# Patient Record
Sex: FEMALE | Race: WHITE | Employment: FULL TIME | URBAN - METROPOLITAN AREA
[De-identification: names, ages, dates, MRNs, and addresses within clinical notes are randomized per-mention and may not be internally consistent; named-entity substitution may affect disease eponyms.]

---
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
## 2022-01-14 ENCOUNTER — ANESTHESIA (OUTPATIENT)
Dept: SURGERY | Age: 22
End: 2022-01-14
Payer: COMMERCIAL

## 2022-01-14 ENCOUNTER — ANESTHESIA EVENT (OUTPATIENT)
Dept: SURGERY | Age: 22
End: 2022-01-14
Payer: COMMERCIAL

## 2022-01-14 ENCOUNTER — HOSPITAL ENCOUNTER (OUTPATIENT)
Age: 22
Setting detail: OUTPATIENT SURGERY
Discharge: HOME | End: 2022-01-14
Attending: STUDENT IN AN ORGANIZED HEALTH CARE EDUCATION/TRAINING PROGRAM | Admitting: STUDENT IN AN ORGANIZED HEALTH CARE EDUCATION/TRAINING PROGRAM
Payer: COMMERCIAL

## 2022-01-14 VITALS
TEMPERATURE: 98.5 F | HEIGHT: 65 IN | OXYGEN SATURATION: 100 % | RESPIRATION RATE: 17 BRPM | SYSTOLIC BLOOD PRESSURE: 106 MMHG | HEART RATE: 67 BPM | WEIGHT: 113 LBS | DIASTOLIC BLOOD PRESSURE: 71 MMHG | BODY MASS INDEX: 18.83 KG/M2

## 2022-01-14 DIAGNOSIS — Z98.890 STATUS POST LAPAROSCOPY: Primary | ICD-10-CM

## 2022-01-14 PROBLEM — O00.90 ECTOPIC PREGNANCY WITHOUT INTRAUTERINE PREGNANCY: Status: ACTIVE | Noted: 2022-01-14

## 2022-01-14 PROBLEM — Z97.5 NEXPLANON IN PLACE: Status: RESOLVED | Noted: 2020-07-16 | Resolved: 2022-01-14

## 2022-01-14 LAB
ABO + RH BLD: NORMAL
BASOPHILS # BLD: 0 K/UL (ref 0–0.1)
BASOPHILS NFR BLD: 0 % (ref 0–2)
BLOOD GROUP ANTIBODIES SERPL: NORMAL
COVID-19 RAPID TEST, COVR: NOT DETECTED
DIFFERENTIAL METHOD BLD: NORMAL
EOSINOPHIL # BLD: 0.1 K/UL (ref 0–0.4)
EOSINOPHIL NFR BLD: 1 % (ref 0–5)
ERYTHROCYTE [DISTWIDTH] IN BLOOD BY AUTOMATED COUNT: 11.9 % (ref 11.6–14.5)
HCT VFR BLD AUTO: 42.1 % (ref 35–45)
HGB BLD-MCNC: 13.9 G/DL (ref 12–16)
IMM GRANULOCYTES # BLD AUTO: 0 K/UL (ref 0–0.04)
IMM GRANULOCYTES NFR BLD AUTO: 0 % (ref 0–0.5)
LYMPHOCYTES # BLD: 1.5 K/UL (ref 0.9–3.6)
LYMPHOCYTES NFR BLD: 25 % (ref 21–52)
MCH RBC QN AUTO: 28.8 PG (ref 24–34)
MCHC RBC AUTO-ENTMCNC: 33 G/DL (ref 31–37)
MCV RBC AUTO: 87.2 FL (ref 78–100)
MONOCYTES # BLD: 0.4 K/UL (ref 0.05–1.2)
MONOCYTES NFR BLD: 6 % (ref 3–10)
NEUTS SEG # BLD: 4.2 K/UL (ref 1.8–8)
NEUTS SEG NFR BLD: 68 % (ref 40–73)
NRBC # BLD: 0 K/UL (ref 0–0.01)
NRBC BLD-RTO: 0 PER 100 WBC
PLATELET # BLD AUTO: 207 K/UL (ref 135–420)
PMV BLD AUTO: 9.7 FL (ref 9.2–11.8)
RBC # BLD AUTO: 4.83 M/UL (ref 4.2–5.3)
SOURCE, COVRS: NORMAL
SPECIMEN EXP DATE BLD: NORMAL
WBC # BLD AUTO: 6.2 K/UL (ref 4.6–13.2)

## 2022-01-14 PROCEDURE — 2709999900 HC NON-CHARGEABLE SUPPLY: Performed by: STUDENT IN AN ORGANIZED HEALTH CARE EDUCATION/TRAINING PROGRAM

## 2022-01-14 PROCEDURE — 76210000063 HC OR PH I REC FIRST 0.5 HR: Performed by: STUDENT IN AN ORGANIZED HEALTH CARE EDUCATION/TRAINING PROGRAM

## 2022-01-14 PROCEDURE — 77030005513 HC CATH URETH FOL11 MDII -B: Performed by: STUDENT IN AN ORGANIZED HEALTH CARE EDUCATION/TRAINING PROGRAM

## 2022-01-14 PROCEDURE — 74011250636 HC RX REV CODE- 250/636

## 2022-01-14 PROCEDURE — 77030013079 HC BLNKT BAIR HGGR 3M -A: Performed by: ANESTHESIOLOGY

## 2022-01-14 PROCEDURE — 74011250636 HC RX REV CODE- 250/636: Performed by: STUDENT IN AN ORGANIZED HEALTH CARE EDUCATION/TRAINING PROGRAM

## 2022-01-14 PROCEDURE — 87635 SARS-COV-2 COVID-19 AMP PRB: CPT

## 2022-01-14 PROCEDURE — 86900 BLOOD TYPING SEROLOGIC ABO: CPT

## 2022-01-14 PROCEDURE — 77030009851 HC PCH RTVR ENDOSC AMR -B: Performed by: STUDENT IN AN ORGANIZED HEALTH CARE EDUCATION/TRAINING PROGRAM

## 2022-01-14 PROCEDURE — 77030033200 HC PRT CLSR CRTR THOMP COOP -C: Performed by: STUDENT IN AN ORGANIZED HEALTH CARE EDUCATION/TRAINING PROGRAM

## 2022-01-14 PROCEDURE — 74011000250 HC RX REV CODE- 250: Performed by: STUDENT IN AN ORGANIZED HEALTH CARE EDUCATION/TRAINING PROGRAM

## 2022-01-14 PROCEDURE — 77030020782 HC GWN BAIR PAWS FLX 3M -B: Performed by: STUDENT IN AN ORGANIZED HEALTH CARE EDUCATION/TRAINING PROGRAM

## 2022-01-14 PROCEDURE — 74011000250 HC RX REV CODE- 250

## 2022-01-14 PROCEDURE — 77030008574 HC TBNG SUC IRR STRY -B: Performed by: STUDENT IN AN ORGANIZED HEALTH CARE EDUCATION/TRAINING PROGRAM

## 2022-01-14 PROCEDURE — 77030002933 HC SUT MCRYL J&J -A: Performed by: STUDENT IN AN ORGANIZED HEALTH CARE EDUCATION/TRAINING PROGRAM

## 2022-01-14 PROCEDURE — 77030016151 HC PROTCTR LNS DFOG COVD -B: Performed by: STUDENT IN AN ORGANIZED HEALTH CARE EDUCATION/TRAINING PROGRAM

## 2022-01-14 PROCEDURE — 77030012770 HC TRCR OPT FX AMR -B: Performed by: STUDENT IN AN ORGANIZED HEALTH CARE EDUCATION/TRAINING PROGRAM

## 2022-01-14 PROCEDURE — 77030040361 HC SLV COMPR DVT MDII -B: Performed by: STUDENT IN AN ORGANIZED HEALTH CARE EDUCATION/TRAINING PROGRAM

## 2022-01-14 PROCEDURE — 77030031139 HC SUT VCRL2 J&J -A: Performed by: STUDENT IN AN ORGANIZED HEALTH CARE EDUCATION/TRAINING PROGRAM

## 2022-01-14 PROCEDURE — 76060000033 HC ANESTHESIA 1 TO 1.5 HR: Performed by: STUDENT IN AN ORGANIZED HEALTH CARE EDUCATION/TRAINING PROGRAM

## 2022-01-14 PROCEDURE — 76010000149 HC OR TIME 1 TO 1.5 HR: Performed by: STUDENT IN AN ORGANIZED HEALTH CARE EDUCATION/TRAINING PROGRAM

## 2022-01-14 PROCEDURE — 77030012799 HC TRCR GELPRT BLN AMR -B: Performed by: STUDENT IN AN ORGANIZED HEALTH CARE EDUCATION/TRAINING PROGRAM

## 2022-01-14 PROCEDURE — 77030006643: Performed by: ANESTHESIOLOGY

## 2022-01-14 PROCEDURE — 77030008683 HC TU ET CUF COVD -A: Performed by: ANESTHESIOLOGY

## 2022-01-14 PROCEDURE — 77030010507 HC ADH SKN DERMBND J&J -B: Performed by: STUDENT IN AN ORGANIZED HEALTH CARE EDUCATION/TRAINING PROGRAM

## 2022-01-14 PROCEDURE — 88305 TISSUE EXAM BY PATHOLOGIST: CPT

## 2022-01-14 PROCEDURE — 77030020829: Performed by: STUDENT IN AN ORGANIZED HEALTH CARE EDUCATION/TRAINING PROGRAM

## 2022-01-14 PROCEDURE — 85025 COMPLETE CBC W/AUTO DIFF WBC: CPT

## 2022-01-14 PROCEDURE — 74011000272 HC RX REV CODE- 272: Performed by: STUDENT IN AN ORGANIZED HEALTH CARE EDUCATION/TRAINING PROGRAM

## 2022-01-14 PROCEDURE — 76210000021 HC REC RM PH II 0.5 TO 1 HR: Performed by: STUDENT IN AN ORGANIZED HEALTH CARE EDUCATION/TRAINING PROGRAM

## 2022-01-14 PROCEDURE — 36415 COLL VENOUS BLD VENIPUNCTURE: CPT

## 2022-01-14 RX ORDER — HYDROCODONE BITARTRATE AND ACETAMINOPHEN 5; 325 MG/1; MG/1
1 TABLET ORAL AS NEEDED
Status: DISCONTINUED | OUTPATIENT
Start: 2022-01-14 | End: 2022-01-14 | Stop reason: HOSPADM

## 2022-01-14 RX ORDER — KETAMINE HCL IN 0.9 % NACL 50 MG/5 ML
SYRINGE (ML) INTRAVENOUS AS NEEDED
Status: DISCONTINUED | OUTPATIENT
Start: 2022-01-14 | End: 2022-01-14 | Stop reason: HOSPADM

## 2022-01-14 RX ORDER — SODIUM CHLORIDE, SODIUM LACTATE, POTASSIUM CHLORIDE, CALCIUM CHLORIDE 600; 310; 30; 20 MG/100ML; MG/100ML; MG/100ML; MG/100ML
125 INJECTION, SOLUTION INTRAVENOUS CONTINUOUS
Status: DISCONTINUED | OUTPATIENT
Start: 2022-01-14 | End: 2022-01-14 | Stop reason: HOSPADM

## 2022-01-14 RX ORDER — FLUMAZENIL 0.1 MG/ML
0.2 INJECTION INTRAVENOUS
Status: DISCONTINUED | OUTPATIENT
Start: 2022-01-14 | End: 2022-01-14 | Stop reason: HOSPADM

## 2022-01-14 RX ORDER — ROCURONIUM BROMIDE 10 MG/ML
INJECTION, SOLUTION INTRAVENOUS AS NEEDED
Status: DISCONTINUED | OUTPATIENT
Start: 2022-01-14 | End: 2022-01-14 | Stop reason: HOSPADM

## 2022-01-14 RX ORDER — SODIUM CHLORIDE 0.9 % (FLUSH) 0.9 %
5-40 SYRINGE (ML) INJECTION AS NEEDED
Status: DISCONTINUED | OUTPATIENT
Start: 2022-01-14 | End: 2022-01-14 | Stop reason: HOSPADM

## 2022-01-14 RX ORDER — FENTANYL CITRATE 50 UG/ML
INJECTION, SOLUTION INTRAMUSCULAR; INTRAVENOUS AS NEEDED
Status: DISCONTINUED | OUTPATIENT
Start: 2022-01-14 | End: 2022-01-14 | Stop reason: HOSPADM

## 2022-01-14 RX ORDER — SODIUM CHLORIDE 0.9 % (FLUSH) 0.9 %
5-40 SYRINGE (ML) INJECTION EVERY 8 HOURS
Status: DISCONTINUED | OUTPATIENT
Start: 2022-01-14 | End: 2022-01-14 | Stop reason: HOSPADM

## 2022-01-14 RX ORDER — GLYCOPYRROLATE 0.2 MG/ML
INJECTION INTRAMUSCULAR; INTRAVENOUS AS NEEDED
Status: DISCONTINUED | OUTPATIENT
Start: 2022-01-14 | End: 2022-01-14 | Stop reason: HOSPADM

## 2022-01-14 RX ORDER — ONDANSETRON 2 MG/ML
4 INJECTION INTRAMUSCULAR; INTRAVENOUS ONCE
Status: COMPLETED | OUTPATIENT
Start: 2022-01-14 | End: 2022-01-14

## 2022-01-14 RX ORDER — MIDAZOLAM HYDROCHLORIDE 1 MG/ML
INJECTION, SOLUTION INTRAMUSCULAR; INTRAVENOUS AS NEEDED
Status: DISCONTINUED | OUTPATIENT
Start: 2022-01-14 | End: 2022-01-14 | Stop reason: HOSPADM

## 2022-01-14 RX ORDER — DEXAMETHASONE SODIUM PHOSPHATE 4 MG/ML
INJECTION, SOLUTION INTRA-ARTICULAR; INTRALESIONAL; INTRAMUSCULAR; INTRAVENOUS; SOFT TISSUE AS NEEDED
Status: DISCONTINUED | OUTPATIENT
Start: 2022-01-14 | End: 2022-01-14 | Stop reason: HOSPADM

## 2022-01-14 RX ORDER — IBUPROFEN 800 MG/1
800 TABLET ORAL
Qty: 60 TABLET | Refills: 1 | OUTPATIENT
Start: 2022-01-14 | End: 2022-05-26

## 2022-01-14 RX ORDER — ONDANSETRON 2 MG/ML
INJECTION INTRAMUSCULAR; INTRAVENOUS
Status: COMPLETED
Start: 2022-01-14 | End: 2022-01-14

## 2022-01-14 RX ORDER — PROPOFOL 10 MG/ML
INJECTION, EMULSION INTRAVENOUS AS NEEDED
Status: DISCONTINUED | OUTPATIENT
Start: 2022-01-14 | End: 2022-01-14 | Stop reason: HOSPADM

## 2022-01-14 RX ORDER — OXYCODONE HYDROCHLORIDE 5 MG/1
5 TABLET ORAL
Qty: 8 TABLET | Refills: 0 | Status: SHIPPED | OUTPATIENT
Start: 2022-01-14 | End: 2022-01-17

## 2022-01-14 RX ORDER — NEOSTIGMINE METHYLSULFATE 1 MG/ML
INJECTION, SOLUTION INTRAVENOUS AS NEEDED
Status: DISCONTINUED | OUTPATIENT
Start: 2022-01-14 | End: 2022-01-14 | Stop reason: HOSPADM

## 2022-01-14 RX ORDER — NALOXONE HYDROCHLORIDE 0.4 MG/ML
0.4 INJECTION, SOLUTION INTRAMUSCULAR; INTRAVENOUS; SUBCUTANEOUS AS NEEDED
Status: DISCONTINUED | OUTPATIENT
Start: 2022-01-14 | End: 2022-01-14 | Stop reason: HOSPADM

## 2022-01-14 RX ORDER — LIDOCAINE HYDROCHLORIDE 20 MG/ML
INJECTION, SOLUTION EPIDURAL; INFILTRATION; INTRACAUDAL; PERINEURAL AS NEEDED
Status: DISCONTINUED | OUTPATIENT
Start: 2022-01-14 | End: 2022-01-14 | Stop reason: HOSPADM

## 2022-01-14 RX ORDER — HYDROMORPHONE HYDROCHLORIDE 1 MG/ML
0.5 INJECTION, SOLUTION INTRAMUSCULAR; INTRAVENOUS; SUBCUTANEOUS
Status: DISCONTINUED | OUTPATIENT
Start: 2022-01-14 | End: 2022-01-14 | Stop reason: HOSPADM

## 2022-01-14 RX ORDER — ONDANSETRON 2 MG/ML
INJECTION INTRAMUSCULAR; INTRAVENOUS AS NEEDED
Status: DISCONTINUED | OUTPATIENT
Start: 2022-01-14 | End: 2022-01-14 | Stop reason: HOSPADM

## 2022-01-14 RX ORDER — FENTANYL CITRATE 50 UG/ML
50 INJECTION, SOLUTION INTRAMUSCULAR; INTRAVENOUS AS NEEDED
Status: DISCONTINUED | OUTPATIENT
Start: 2022-01-14 | End: 2022-01-14 | Stop reason: HOSPADM

## 2022-01-14 RX ORDER — SUCCINYLCHOLINE CHLORIDE 100 MG/5ML
SYRINGE (ML) INTRAVENOUS AS NEEDED
Status: DISCONTINUED | OUTPATIENT
Start: 2022-01-14 | End: 2022-01-14 | Stop reason: HOSPADM

## 2022-01-14 RX ADMIN — Medication 80 MG: at 18:10

## 2022-01-14 RX ADMIN — MIDAZOLAM 2 MG: 1 INJECTION INTRAMUSCULAR; INTRAVENOUS at 18:03

## 2022-01-14 RX ADMIN — SODIUM CHLORIDE, SODIUM LACTATE, POTASSIUM CHLORIDE, AND CALCIUM CHLORIDE: 600; 310; 30; 20 INJECTION, SOLUTION INTRAVENOUS at 18:02

## 2022-01-14 RX ADMIN — Medication 2 MG: at 18:55

## 2022-01-14 RX ADMIN — SODIUM CHLORIDE, SODIUM LACTATE, POTASSIUM CHLORIDE, AND CALCIUM CHLORIDE 125 ML/HR: 600; 310; 30; 20 INJECTION, SOLUTION INTRAVENOUS at 17:52

## 2022-01-14 RX ADMIN — ROCURONIUM BROMIDE 20 MG: 10 INJECTION, SOLUTION INTRAVENOUS at 18:22

## 2022-01-14 RX ADMIN — FENTANYL CITRATE 50 MCG: 50 INJECTION, SOLUTION INTRAMUSCULAR; INTRAVENOUS at 18:26

## 2022-01-14 RX ADMIN — DEXAMETHASONE SODIUM PHOSPHATE 8 MG: 4 INJECTION, SOLUTION INTRAMUSCULAR; INTRAVENOUS at 18:23

## 2022-01-14 RX ADMIN — FENTANYL CITRATE 50 MCG: 50 INJECTION, SOLUTION INTRAMUSCULAR; INTRAVENOUS at 18:08

## 2022-01-14 RX ADMIN — ONDANSETRON 4 MG: 2 INJECTION INTRAMUSCULAR; INTRAVENOUS at 19:57

## 2022-01-14 RX ADMIN — ONDANSETRON HYDROCHLORIDE 4 MG: 2 INJECTION INTRAMUSCULAR; INTRAVENOUS at 18:23

## 2022-01-14 RX ADMIN — Medication 10 MG: at 18:39

## 2022-01-14 RX ADMIN — Medication 30 MG: at 18:28

## 2022-01-14 RX ADMIN — GLYCOPYRROLATE 0.2 MG: 0.2 INJECTION INTRAMUSCULAR; INTRAVENOUS at 18:55

## 2022-01-14 RX ADMIN — LIDOCAINE HYDROCHLORIDE 50 MG: 20 INJECTION, SOLUTION INTRAVENOUS at 18:10

## 2022-01-14 RX ADMIN — PROPOFOL 120 MG: 10 INJECTION, EMULSION INTRAVENOUS at 18:10

## 2022-01-14 RX ADMIN — SUGAMMADEX 100 MG: 100 INJECTION, SOLUTION INTRAVENOUS at 19:14

## 2022-01-14 NOTE — H&P
History & Physical    Name: Lilian Woods MRN: [id number]  SSN: [id number]    YOB: 2000  Age: 22 y.o. Sex: female        Subjective:     Ms. Isabel Meyers is a 27yo  admitted for diagnostic laparoscopy, possible left salpingectomy, possible left oophorectomy, removal of ectopic pregnancy. She was diagnosed with left adnexal ectopic pregnancy in the office. [facility] with inappropriate rise. She reports that she had episode of heavy bleeding yesterday (did not notify us) and has some generalized abdominal soreness. She does have some vaginal bleeding at this time. Blood type is A positive    Past Medical History:   Diagnosis Date    Herpes genitalia 2019     History reviewed. No pertinent surgical history. Social History     Occupational History    Not on file   Tobacco Use    Smoking status: Never Smoker    Smokeless tobacco: Current User   Vaping Use    Vaping Use: Never used   Substance and Sexual Activity    Alcohol use: Yes    Drug use: Never    Sexual activity: Yes     Partners: Male     Birth control/protection: Implant     Comment: Nexplanon- inserted 2020 (outside clinic)     History reviewed. No pertinent family history. Allergies   Allergen Reactions    Penicillins Anaphylaxis and Hives    Cephalexin Hives     Prior to Admission medications    Medication Sig Start Date End Date Taking? Authorizing Provider   etonogestreL (Nexplanon) 68 mg impl by SubDERmal route. Patient not taking: Reported on 2022    Provider, Historical        Review of Systems: A comprehensive review of systems was negative except for that written in the HPI. Objective:     Vitals:  Vitals:    22 1707   BP: 113/73   Pulse: 86   Resp: 14   Temp: 98.6 °F (37 °C)   SpO2: 100%   Weight: 51.3 kg (113 lb)   Height: 5' 5\" (1.651 m)        Physical Exam:  Patient without distress.   Heart: Regular rate and rhythm  Lung: clear to auscultation throughout lung fields and no wheezes, no rales, no rhonchi  Abdomen: soft, nontender, minimal tenderness      Assessment/Plan:     Active Problems:    Ectopic pregnancy without intrauterine pregnancy (1/14/2022)         Plan: Admit for above procedure. R/B/A discussed in detail. Do not feel she is a good candidate for medical management. Discussed that even with salpingectomy, current data does not show that there is any reduction in fertility in future. Patient in agreement with plan. Consents signed. Rapid COVID test approved by Dr. Alexandra Cifuentes.      Signed By:  Umer Contreras MD     January 14, 2022

## 2022-01-14 NOTE — ANESTHESIA PREPROCEDURE EVALUATION
Relevant Problems   No relevant active problems       Anesthetic History        Pertinent negatives: No PONV       Review of Systems / Medical History  Patient summary reviewed, nursing notes reviewed and pertinent labs reviewed    Pulmonary          Smoker (vaped, did not vape today)    Pertinent negatives: No asthma and sleep apnea     Neuro/Psych     seizures (single seizure age 12 after PCN)         Cardiovascular                Pertinent negatives: No hypertension, valvular problems/murmurs and dysrhythmias  Exercise tolerance: >4 METS     GI/Hepatic/Renal             Pertinent negatives: No GERD, liver disease and renal disease   Endo/Other          Pertinent negatives: No diabetes and obesity   Other Findings              Physical Exam    Airway  Mallampati: I  TM Distance: 4 - 6 cm  Neck ROM: normal range of motion   Mouth opening: Normal     Cardiovascular    Rhythm: regular  Rate: normal         Dental  No notable dental hx       Pulmonary  Breath sounds clear to auscultation               Abdominal  GI exam deferred       Other Findings            Anesthetic Plan    ASA: 2  Anesthesia type: general          Induction: Intravenous  Anesthetic plan and risks discussed with: Patient      Plan GETA. Pt understands and accepts associated risks and agrees with plan. All questions answered. Consent signed.

## 2022-01-14 NOTE — PERIOP NOTES
Reviewed PTA medication list with patient/caregiver and patient/caregiver denies any additional medications.  Patient admits to having a responsible adult care for them for at least 24 hours after surgery.     Primary Nurse John Marroquin RN and Amanuel Baltazar RN performed a dual skin assessment on this patient No impairment noted  Socrates score is 23

## 2022-01-14 NOTE — OP NOTES
Operative Report    Patient: Rossi Flynn MRN: [id number]  SSN: [id number]    YOB: 2000  Age: 22 y.o. Sex: female       Date of Surgery: 1/14/2022     Preoperative Diagnosis: Left ECTOPIC PREGNACY     Postoperative Diagnosis: ECTOPIC PREGNACY     Surgeon(s) and Role:     Katlin Rebolledo MD - Primary    Assistant:  Surg Asst-1: Lester Ribeiro  Tasks: Holding camera, retracting, closing    PROCEDURE:    1. Diagnostic laparoscopy  2. Left salpingectomy, removal of ectopic pregnancy    FINDINGS:   1. Laparoscopic entry showed no evidence of blunt or sharp trauma. Insufflation gas noted in preperitoneal space. 2. Uterus was normal size  3. Right Adnexa & Ovarian Fossa normal  4. Left Adnexa with 3cm ectopic pregnancy in fallopian tube. Ovarian Fossa normal  5. Anterior Cul de Sac normal  6. Posterior Cul de Sac with ~15cc blood. EBL:   Minimal <85RS    COMPLICATIONS:  NONE  DRAINS: thomas catheter, removed after conclusion of case  IMPLANTS: NONE    DISPOSITION:  To recovery room in stable condition    PROCEDURE:  The patient was taken to the operating room where general anesthesia was administered and found to be adequate. She was placed in the [address] [place] [address], prepped and draped in the usual sterile fashion. A [employer] manipulator was placed in a normal fashion. Abdominal entry was gained by using a Veress needle. Initially noted to have normal opening pressure but it laila quickly. Infraumbilical trocar was then placed under direct vision. The abdomen and pelvis were then examined laparoscopically and the above findings were noted. A 5mm trocar was placed under direct vision in the LLQ and a 12mm trocar was placed in the RLQ under direct vision. The ectopic pregnancy was noted in the L fallopian tube. The fallopian tube was elevated out of the pelvis. A Harmonic scalpel was used to excise the fallopian tube.  The fallopian tube was placed in an endocatch bag and removed through the 12mm port. The abdomen was then inspected and the pressures were decreased to 5 mmHg and good hemostasis was noted. The trocars were removed under direct vision. The 12mm port was closed with 0-Vicryl. The skin was closed with 4-0 monocryl. Specimens:   ID Type Source Tests Collected by Time Destination   1 : LEFT ECTOPIC PREGNANCY, LEFT FALLOPIAN TUBE Preservative Fallopian [place]  Jeana Salas MD 1/14/2022 1843 Pathology         Counts: Sponge and needle counts were correct times two.     Signed By:  Olive Leung MD     January 14, 2022      Event Time In   Patient In - Facility (Arrived)    Patient In - Pre-op    Patient Ready - For Holding    Patient Out - Pre-op    Patient In - Holding Area 4457   Anesthesia Start 3633   Patient Ready for OR    Surgeon Available    Patient [facility]   Patient In - Port [facility]   Surgeon In [address] [place] [address] Street 1800   Cecum Time    Incision Start 1823   Incision Close 1902   Surgeon out of [address]   Patient Out - OR    PACU Hold in OR     Anesthesia Stop    Patient In - Phase I    Notice to Anesthesia    Care Complete - Phase I    Patient Out - Phase I    Patient In - Phase II    Care Complete - Phase II    Patient Tolerates Liquids    Patient Ready for Visitors    Patient Education Complete    Patient Voided    Patient Out - Phase II    End of Periop Care    Patient In - Overflow    Patient Out - Overflow

## 2022-01-15 NOTE — ANESTHESIA POSTPROCEDURE EVALUATION
Post-Anesthesia Evaluation and Assessment    Patient: Esau Vargas MRN: [id number]  SSN: [id number]   YOB: 2000  Age: [address] y.o. Sex: female      Cardiovascular Function/Vital Signs  /68   Pulse 82   Temp 36.1 °C (97 °F)   Resp 14   Ht 5' 5\" (1.651 m)   Wt 51.3 kg (113 lb)   SpO2 100%   BMI 18.80 kg/m²     Patient is status post Procedure(s):  LAPAROSCOPY, REMOVAL LEFT E CTOPIC PREGNANCY, LEFT SALPINGECTOMY. Nausea/Vomiting: Controlled. Postoperative hydration reviewed and adequate. Pain:  Pain Scale 1: Numeric (0 - 10) (01/14/22 1940)  Pain Intensity 1: 0 (01/14/22 1940)   Managed. Neurological Status:   Neuro (WDL): Within Defined Limits (01/14/22 1714)   At baseline. Mental Status and Level of Consciousness: Arousable. Pulmonary Status:   O2 Device: None (Room air) (01/14/22 1707)   Adequate oxygenation and airway patent. Complications related to anesthesia: None    Post-anesthesia assessment completed. No concerns. Patient has met all discharge requirements. Signed By: Piedad Nunez CRNA    January 14, 2022             Procedure(s):  LAPAROSCOPY, REMOVAL LEFT E CTOPIC PREGNANCY, LEFT SALPINGECTOMY. general    <BSHSIANPOST>    INITIAL Post-op Vital signs:   Vitals Value Taken Time   /74 01/14/22 1950   Temp 36.1 °C (97 °F) 01/14/22 1922   Pulse 78 01/14/22 1951   Resp 9 01/14/22 1951   SpO2 100 % 01/14/22 1951   Vitals shown include unvalidated device data.

## 2022-01-15 NOTE — DISCHARGE INSTRUCTIONS
Treating an Ectopic Pregnancy With Surgery: Care Instructions  Overview     An ectopic pregnancy occurs when a fertilized egg grows outside of the uterus. In a normal pregnancy, the fertilized egg grows inside the uterus. In most ectopic pregnancies, the egg grows in a fallopian tube. This is also called a tubal pregnancy. Sometimes the egg grows in an ovary or some other place in the belly. But this is rare. An ectopic pregnancy never becomes a normal pregnancy and birth. You had surgery to treat your ectopic pregnancy. This was done to prevent dangerous problems. After your surgery, you may have vaginal bleeding that's like a period. It may last for about a week. You may need a few weeks to recover. You should be able to have a normal pregnancy in the future. But you may have a higher risk for more ectopic pregnancies. Tell your doctor right away if you get pregnant again. Follow-up care is a key part of your treatment and safety. Be sure to make and go to all appointments, and call your doctor if you are having problems. It's also a good idea to know your test results and keep a list of the medicines you take. How can you care for yourself at home? · Your doctor might want you to use sanitary pads if you have vaginal bleeding. Using pads makes it easier to keep track of your bleeding. You may use tampons during your next period. It should start in 3 to 6 weeks. · Ask your doctor when you can have vaginal sex again. · Be safe with medicines. Read and follow all instructions on the label. ? If the doctor gave you a prescription medicine for pain, take it as prescribed. ? If you are not taking a prescription pain medicine, ask your doctor if you can take an over-the-counter medicine. · Get plenty of rest. You may be more tired than normal for a few weeks. · Take it easy and avoid lifting until your doctor tells you it is safe to do your normal activities. · Pay attention to your feelings.  If you're sad and it's not getting any easier, talk with your doctor or a counselor. · Talk to your doctor if you want to try to get pregnant soon. The doctor can tell you when it's safe to do so. · If you don't want to get pregnant, ask your doctor about birth control. It's safe to use birth control during your recovery. It's possible to get pregnant again before your next period starts. When should you call for help? Call 911 anytime you think you may need emergency care. For example, call if:    · You passed out (lost consciousness). Call your doctor now or seek immediate medical care if:    · You have severe vaginal bleeding.     · You are dizzy or lightheaded, or you feel like you may faint.     · You have a fever.     · You have new or worse pain in your belly or pelvis.     · You have vaginal discharge that smells bad. Watch closely for changes in your health, and be sure to contact your doctor if:    · You do not get better as expected. Where can you learn more? Go to http://www.gray.com/  Enter J774 in the search box to learn more about \"Treating an Ectopic Pregnancy With Surgery: Care Instructions. \"  Current as of: June 16, 2021               Content Version: 13.0  © 2006-2021 Healthwise, Incorporated. Care instructions adapted under license by [facility] (which disclaims liability or warranty for this information). If you have questions about a medical condition or this instruction, always ask your healthcare professional. [place] [address] any warranty or liability for your use of this information.     .    .  Hansel Common from Nurse    PATIENT INSTRUCTIONS:    After general anesthesia or intravenous sedation, for 24 hours or while taking prescription Narcotics:  · Limit your activities  · Do not drive and operate hazardous machinery  · Do not make important personal or business decisions  · Do  not drink alcoholic beverages  · If you have not urinated within 8 hours after discharge, please contact your surgeon on call. Report the following to your surgeon:  · Excessive pain, swelling, redness or odor of or around the surgical area  · Temperature over 100.5  · Nausea and vomiting lasting longer than 4 hours or if unable to take medications  · Any signs of decreased circulation or nerve impairment to extremity: change in color, persistent  numbness, tingling, coldness or increase pain  · Any questions    What to do at Home:  Recommended activity: Activity as tolerated and no driving for today. If you experience any of the following symptoms Fever, chills, bleeding, uncontrolled pain, nausea or vomitting, please follow up with Dr Galindo Brown. *  Please give a list of your current medications to your Primary Care Provider. *  Please update this list whenever your medications are discontinued, doses are      changed, or new medications (including over-the-counter products) are added. *  Please carry medication information at all times in case of emergency situations. These are general instructions for a healthy lifestyle:    No smoking/ No tobacco products/ Avoid exposure to second hand smoke  Surgeon General's Warning:  Quitting smoking now greatly reduces serious risk to your health. Obesity, smoking, and sedentary lifestyle greatly increases your risk for illness    A healthy diet, regular physical exercise & weight monitoring are important for maintaining a healthy lifestyle    You may be retaining fluid if you have a history of heart failure or if you experience any of the following symptoms:  Weight gain of 3 pounds or more overnight or 5 pounds in a week, increased swelling in our hands or feet or shortness of breath while lying flat in bed. Please call your doctor as soon as you notice any of these symptoms; do not wait until your next office visit. The discharge information has been reviewed with the patient and caregiver. The patient and caregiver verbalized understanding. Discharge medications reviewed with the patient and caregiver and appropriate educational materials and side effects teaching were provided. Patient armband removed and shredded    ___________________________________________________________________________________________________________________________________    Learning About Coronavirus (CPCQA-06)  Coronavirus (COVID-19): Overview  What is coronavirus (TNYUL-07)? The coronavirus disease (COVID-19) is caused by a virus. It is an illness that was first found in [place], [place], in December 2019. It has since spread worldwide. The virus can cause fever, cough, and trouble breathing. In severe cases, it can cause pneumonia and make it hard to breathe without help. It can cause death. Coronaviruses are a large group of viruses. They cause the common cold. They also cause more serious illnesses like Middle East respiratory syndrome (MERS) and severe acute respiratory syndrome (SARS). COVID-19 is caused by a novel coronavirus. That means it's a new type that has not been seen in people before. This virus spreads person-to-person through droplets from coughing and sneezing. It can also spread when you are close to someone who is infected. And it can spread when you touch something that has the virus on it, such as a doorknob or a tabletop. What can you do to protect yourself from coronavirus (COVID-19)? The best way to protect yourself from getting sick is to:  · Avoid areas where there is an outbreak. · Avoid contact with people who may be infected. · Wash your hands often with soap or alcohol-based hand sanitizers. · Avoid crowds and try to stay at least 6 feet away from other people. · Wash your hands often, especially after you cough or sneeze. Use soap and water, and scrub for at least 20 seconds. If soap and water aren't available, use an alcohol-based hand .   · Avoid touching your mouth, nose, and eyes. What can you do to avoid spreading the virus to others? To help avoid spreading the virus to others:  · Cover your mouth with a tissue when you cough or sneeze. Then throw the tissue in the trash. · Use a disinfectant to clean things that you touch often. · Stay home if you are sick or have been exposed to the virus. Don't go to school, work, or public areas. And don't use public transportation. · If you are sick:  ? Leave your home only if you need to get medical care. But call the doctor's office first so they know you're coming. And wear a face mask, if you have one.  ? If you have a face mask, wear it whenever you're around other people. It can help stop the spread of the virus when you cough or sneeze. ? Clean and disinfect your home every day. Use household  and disinfectant wipes or sprays. Take special care to clean things that you grab with your hands. These include doorknobs, remote controls, phones, and handles on your refrigerator and microwave. And don't forget countertops, tabletops, bathrooms, and computer keyboards. When to call for help  Call 911 anytime you think you may need emergency care. For example, call if:  · You have severe trouble breathing. (You can't talk at all.)  · You have constant chest pain or pressure. · You are severely dizzy or lightheaded. · You are confused or can't think clearly. · Your face and lips have a blue color. · You pass out (lose consciousness) or are very hard to wake up. Call your doctor now if you develop symptoms such as:  · Shortness of breath. · Fever. · Cough. If you need to get care, call ahead to the doctor's office for instructions before you go. Make sure you wear a face mask, if you have one, to prevent exposing other people to the virus. Where can you get the latest information? The following health organizations are tracking and studying this virus. Their websites contain the most up-to-date information.  Carie Gamboa also learn what to do if you think you may have been exposed to the virus. · U.S. Centers for Disease Control and Prevention (CDC): The CDC provides updated news about the disease and travel advice. The website also tells you how to prevent the spread of infection. www.cdc.gov  · World Health Organization [facility]): WHO offers information about the virus outbreaks. WHO also has travel advice. www.who.int  Current as of: April 1, 2020               Content Version: 12.4  © 2006-2020 Healthwise, Incorporated. Care instructions adapted under license by your healthcare professional. If you have questions about a medical condition or this instruction, always ask your healthcare professional. [address] any warranty or liability for your use of this information.

## 2022-01-15 NOTE — PERIOP NOTES
TRANSFER - IN REPORT:    Verbal report received from MASOUD Mercado CRNA(name) on Steffanie Finer  being received from OR(unit) for routine progression of care      Report consisted of patients Situation, Background, Assessment and   Recommendations(SBAR). Information from the following report(s) SBAR, OR Summary, Procedure Summary, Intake/Output, MAR and Recent Results was reviewed with the receiving nurse. Opportunity for questions and clarification was provided. Assessment completed upon patients arrival to unit and care assumed.

## 2022-05-21 ENCOUNTER — HOSPITAL ENCOUNTER (EMERGENCY)
Age: 22
Discharge: HOME | End: 2022-05-21
Attending: EMERGENCY MEDICINE
Payer: COMMERCIAL

## 2022-05-21 VITALS
RESPIRATION RATE: 18 BRPM | HEART RATE: 80 BPM | OXYGEN SATURATION: 100 % | SYSTOLIC BLOOD PRESSURE: 106 MMHG | DIASTOLIC BLOOD PRESSURE: 66 MMHG | WEIGHT: 115 LBS | BODY MASS INDEX: 19.14 KG/M2 | TEMPERATURE: 98.2 F

## 2022-05-21 DIAGNOSIS — O09.10 PREGNANCY WITH HISTORY OF ECTOPIC PREGNANCY, ANTEPARTUM: ICD-10-CM

## 2022-05-21 DIAGNOSIS — O26.859 SPOTTING AFFECTING PREGNANCY, ANTEPARTUM: Primary | ICD-10-CM

## 2022-05-21 LAB
ABO + RH BLD: NORMAL
ALBUMIN SERPL-MCNC: 3.6 G/DL (ref 3.4–5)
ALBUMIN/GLOB SERPL: 1.3 {RATIO} (ref 0.8–1.7)
ALP SERPL-CCNC: 54 U/L (ref 45–117)
ALT SERPL-CCNC: 30 U/L (ref 13–56)
ANION GAP SERPL CALC-SCNC: 8 MMOL/L (ref 3–18)
AST SERPL-CCNC: 22 U/L (ref 10–38)
BASOPHILS # BLD: 0 K/UL (ref 0–0.1)
BASOPHILS NFR BLD: 0 % (ref 0–2)
BILIRUB SERPL-MCNC: 1 MG/DL (ref 0.2–1)
BLOOD GROUP ANTIBODIES SERPL: NORMAL
BUN SERPL-MCNC: 18 MG/DL (ref 7–18)
BUN/CREAT SERPL: 29 (ref 12–20)
CALCIUM SERPL-MCNC: 8.4 MG/DL (ref 8.5–10.1)
CHLORIDE SERPL-SCNC: 108 MMOL/L (ref 100–111)
CO2 SERPL-SCNC: 23 MMOL/L (ref 21–32)
CREAT SERPL-MCNC: 0.63 MG/DL (ref 0.6–1.3)
DIFFERENTIAL METHOD BLD: ABNORMAL
EOSINOPHIL # BLD: 0.3 K/UL (ref 0–0.4)
EOSINOPHIL NFR BLD: 3 % (ref 0–5)
ERYTHROCYTE [DISTWIDTH] IN BLOOD BY AUTOMATED COUNT: 12 % (ref 11.6–14.5)
GLOBULIN SER CALC-MCNC: 2.7 G/DL (ref 2–4)
GLUCOSE SERPL-MCNC: 91 MG/DL (ref 74–99)
HCG SERPL-ACNC: 373 MIU/ML (ref 0–10)
HCT VFR BLD AUTO: 35.2 % (ref 35–45)
HGB BLD-MCNC: 11.6 G/DL (ref 12–16)
IMM GRANULOCYTES # BLD AUTO: 0 K/UL (ref 0–0.04)
IMM GRANULOCYTES NFR BLD AUTO: 0 % (ref 0–0.5)
LYMPHOCYTES # BLD: 2.4 K/UL (ref 0.9–3.6)
LYMPHOCYTES NFR BLD: 26 % (ref 21–52)
MCH RBC QN AUTO: 28.7 PG (ref 24–34)
MCHC RBC AUTO-ENTMCNC: 33 G/DL (ref 31–37)
MCV RBC AUTO: 87.1 FL (ref 78–100)
MONOCYTES # BLD: 0.7 K/UL (ref 0.05–1.2)
MONOCYTES NFR BLD: 8 % (ref 3–10)
NEUTS SEG # BLD: 5.6 K/UL (ref 1.8–8)
NEUTS SEG NFR BLD: 62 % (ref 40–73)
NRBC # BLD: 0 K/UL (ref 0–0.01)
NRBC BLD-RTO: 0 PER 100 WBC
PLATELET # BLD AUTO: 202 K/UL (ref 135–420)
PMV BLD AUTO: 10.2 FL (ref 9.2–11.8)
POTASSIUM SERPL-SCNC: 3.5 MMOL/L (ref 3.5–5.5)
PROT SERPL-MCNC: 6.3 G/DL (ref 6.4–8.2)
RBC # BLD AUTO: 4.04 M/UL (ref 4.2–5.3)
SODIUM SERPL-SCNC: 139 MMOL/L (ref 136–145)
SPECIMEN EXP DATE BLD: NORMAL
WBC # BLD AUTO: 9.1 K/UL (ref 4.6–13.2)

## 2022-05-21 PROCEDURE — 86900 BLOOD TYPING SEROLOGIC ABO: CPT

## 2022-05-21 PROCEDURE — 80053 COMPREHEN METABOLIC PANEL: CPT

## 2022-05-21 PROCEDURE — 85025 COMPLETE CBC W/AUTO DIFF WBC: CPT

## 2022-05-21 PROCEDURE — 84702 CHORIONIC GONADOTROPIN TEST: CPT

## 2022-05-21 PROCEDURE — 99283 EMERGENCY DEPT VISIT LOW MDM: CPT

## 2022-05-21 NOTE — ED NOTES
C/O severe RLQ abd/groin pain that Pt endorses feeling similar to previous ectopic pregnancy. Endorses scant brown vaginal discharge. Seen previously at ED 4 days ago, but endorses increased sx and is concerned r/t previous ectopic and loss of L fallopian tube.

## 2022-05-25 NOTE — ED PROVIDER NOTES
EMERGENCY DEPARTMENT HISTORY AND PHYSICAL EXAM    Date: 2022  Patient Name: Lillie Nuñez    History of Presenting Illness     Chief Complaint   Patient presents with    Ectopic Pregnancy         History Provided By: Patient    Additional History (Context):   2:43 AM   Lillie Nuñez is a 22 y.o. female with PMHX of ectopic pregnancy who presents to the emergency department C/O no pregnancy complications. Patient is  with previous ectopic 4 months ago. Her current LMP is 2022. No previous ectopic presented with minimal symptoms and suddenly required left salpingectomy for ectopic removal 2022. She was seen at [facility] 4 days ago on the  with confirmed first trimester pregnancy. Her quant at that point was 51. She presents today with very mild symptoms of bleeding is a scant brown vaginal discharge and previous abdominal cramping afraid that she is going to have another ectopic. Her pain level right now is actually 0. There are no increasing decreasing changing factors and it does not radiate. She has no dysuria. She has no nausea vomiting diarrhea or other complaints. Social History  Denies smoking drinking or drugs    Family History  No lupus autoimmune disease or bleeding disorders. PCP: None    Current Outpatient Medications   Medication Sig Dispense Refill    ibuprofen (MOTRIN) 800 mg tablet Take 1 Tablet by mouth every eight (8) hours as needed for Pain. 60 Tablet 1       Past History     Past Medical History:  Past Medical History:   Diagnosis Date    Herpes genitalia 2019       Past Surgical History:  No past surgical history on file. Family History:  No family history on file. Social History:  Social History     Tobacco Use    Smoking status: Never Smoker    Smokeless tobacco: Current User   Vaping Use    Vaping Use: Never used   Substance Use Topics    Alcohol use: Yes    Drug use: Never       Allergies:   Allergies Allergen Reactions    Penicillins Anaphylaxis and Hives    Cephalexin Hives         Review of Systems   Review of Systems   Gastrointestinal: Positive for abdominal pain. Genitourinary: Positive for pelvic pain and vaginal bleeding. All other systems reviewed and are negative. Physical Exam     Vitals:    05/21/22 0245 05/21/22 0300 05/21/22 0330 05/21/22 0430   BP: 118/77 109/67 117/62 106/66   Pulse:       Resp:       Temp:       SpO2:  100% 100% 100%   Weight:         Physical Exam  Vitals and nursing note reviewed. Constitutional:       General: She is not in acute distress. Appearance: She is well-developed. She is not diaphoretic. HENT:      Head: Normocephalic and atraumatic. Eyes:      General: No scleral icterus. Extraocular Movements:      Right eye: Normal extraocular motion. Left eye: Normal extraocular motion. Conjunctiva/sclera: Conjunctivae normal.      Pupils: Pupils are equal, round, and reactive to light. Neck:      Trachea: No tracheal deviation. Cardiovascular:      Rate and Rhythm: Normal rate and regular rhythm. Heart sounds: Normal heart sounds. Pulmonary:      Effort: Pulmonary effort is normal. No respiratory distress. Breath sounds: Normal breath sounds. No stridor. Abdominal:      General: Bowel sounds are normal. There is no distension. Palpations: Abdomen is soft. Tenderness: There is no abdominal tenderness. There is no rebound. Comments: Nontender   Musculoskeletal:         General: No tenderness. Normal range of motion. Cervical back: Normal range of motion and neck supple. Comments: Grossly unremarkable without abnormalities   Skin:     General: Skin is warm and dry. Capillary Refill: Capillary refill takes less than 2 seconds. Findings: No erythema or rash. Neurological:      Mental Status: She is alert and oriented to person, place, and time. GCS: GCS eye subscore is 4.  GCS verbal subscore is 5. GCS motor subscore is 6. Cranial Nerves: No cranial nerve deficit. Motor: No weakness. Psychiatric:         Mood and Affect: Mood normal.         Behavior: Behavior normal.         Thought Content: Thought content normal.         Judgment: Judgment normal.       Diagnostic Study Results     Labs -  Lab Results           Contains abnormal data BETA HCG, QT (Final result)   Component (Lab Inquiry)  Collection Time Result Time HCGN   05/21/22 02:48:00 05/21/22 03:22:19 373           SEE RANGE BELO. .. High          Final result                          TYPE & SCREEN (Final result)   Component (Lab Inquiry)  Collection Time Result Time %EXX %ABR %AS   05/21/22 02:48:00 05/21/22 03:41:57 05/24/2022,2359 A POSITIVE NEG   Previous Results   01/14/22 17:20:00 01/14/22 18:13:47 01/17/2022,2359 A POSITIVE NEG         Final result                           Contains abnormal data CBC WITH AUTOMATED DIFF (Final result)   Component (Lab Inquiry)  Collection Time Result Time WBC RBC HGB HCT MCV MCH MCHC RDW PLT MPLV   05/21/22 02:48:00 05/21/22 03:02:35 9.1 4.04 Low  11.6 Low  35.2 87.1 28.7 33.0 12.0 202 10.2   Previous Results   01/14/22 17:20:00 01/14/22 17:42:58 6.2 4.83 13.9 42.1 87.2 28.8 33.0 11.9 207 9.7   07/18/19 11:12:00 07/18/19 11:53:41 6.1 4.81 13.6 41.8 86.9 28.3 32.5 12.7 222 10.1       Collection Time Result Time NRBC ANRBC GRANS LYMPH MONOS EOS BASOS IG ABG ABL   05/21/22 02:48:00 05/21/22 03:02:35 0.0 0.00 62 26 8 3 0 0 5.6 2.4   Previous Results   01/14/22 17:20:00 01/14/22 17:42:58 0.0 0.00 68 25 6 1 0 0 4.2 1.5   07/18/19 11:12:00 07/18/19 11:53:41   69 22 6 3 0  4.2 1.3       Collection Time Result Time ABM LUIS ANGEL ABB AIG DF   05/21/22 02:48:00 05/21/22 03:02:35 0.7 0.3 0.0 0.0 AUTOMATED   Previous Results   01/14/22 17:20:00 01/14/22 17:42:58 0.4 0.1 0.0 0.0 AUTOMATED   07/18/19 11:12:00 07/18/19 11:53:41 0.4 0.2 0.0  AUTOMATED         Final result                           Contains abnormal data METABOLIC PANEL, COMPREHENSIVE (Final result)   Component (Lab Inquiry)  Collection Time Result Time NA K CL CO2 AGAP GLU BUN CREA BUCR GFRAA   05/21/22 02:48:00 05/21/22 03:22:19 139 3.5 108 23 8 91 18 0.63 29 High  >60   Previous Results   07/18/19 11:12:00 07/18/19 12:10:36 142 4.6 108   PLEASE NOTE NEW REFERENCE RANGE 30 4 93 15 0.72 21 High  Cannot be calculated       Collection Time Result Time GFRNA CA TBIL GPT SGOT AP TP ALB GLOB AGRAT   05/21/22 02:48:00 05/21/22 03:22:19 >60   (NOTE)   Estimated GFR . .. 8.4 Low  1.0 30 22 54 6.3 Low  3.6 2.7 1.3   Previous Results   07/18/19 11:12:00 07/18/19 12:10:36 Cannot be calculated   (NOTE)   Estimated GFR . .. 9.5 1.1 High  19 12   PLEASE NOTE NEW REFERENCE RANGE 76 7.3 4.4 2.9 1.5         Final result                       Radiologic Studies -   No orders to display     CT Results  (Last 48 hours)    None        CXR Results  (Last 48 hours)    None          Medications given in the ED-  Medications - No data to display      Medical Decision Making   I am the first provider for this patient. I reviewed the vital signs, available nursing notes, past medical history, past surgical history, family history and social history. Vital Signs-Reviewed the patient's vital signs. Pulse Oximetry Analysis - 100% on room air    Cardiac Monitor:  Rate: 82 bpm  Rhythm: Sinus rhythm      Records Reviewed: NURSING NOTES AND PREVIOUS MEDICAL RECORDS    Provider Notes (Medical Decision Making):   Patient remains quite stable since confirming pregnancy with scant brown discharge. Her quant 4 days has moved upwards towards 273. She is Rh+ does not need RhoGAM.  We discussed likelihood of finding anything on ultrasound and given her symptoms unlikely to confirm anything regarding inflammatory process at this point. Her pain level is 0 unlikely represent graduating ectopic pregnancy.     Procedures:  Procedures    ED Course:   2:43 AM : Initial assessment performed. The patients presenting problems have been discussed, and they are in agreement with the care plan formulated and outlined with them. I have encouraged them to ask questions as they arise throughout their visit. Diagnosis and Disposition       DISCHARGE NOTE:  4:37 AM  Kedar Cagle's  results have been reviewed with her. She has been counseled regarding her diagnosis, treatment, and plan. She verbally conveys understanding and agreement of the signs, symptoms, diagnosis, treatment and prognosis and additionally agrees to follow up as discussed. She also agrees with the care-plan and conveys that all of her questions have been answered. I have also provided discharge instructions for her that include: educational information regarding their diagnosis and treatment, and list of reasons why they would want to return to the ED prior to their follow-up appointment, should her condition change. She has been provided with education for proper emergency department utilization. CLINICAL IMPRESSION:    1. Spotting affecting pregnancy, antepartum    2. Pregnancy with history of ectopic pregnancy, antepartum        PLAN:  1. D/C Home  2. Discharge Medication List as of 5/21/2022  4:37 AM        3. Follow-up Information     Follow up With Specialties Details Why Contact Info    Malcolm Reddy MD Obstetrics & Gynecology, Gynecology, Obstetrics Schedule an appointment as soon as possible for a visit   [address] [place] [address]  [phone]          _______________________________    This note was partially transcribed via voice recognition software. Although efforts have been made to catch any discrepancies, it may contain sound alike words, grammatical errors, or nonsensical words.

## 2022-05-26 ENCOUNTER — APPOINTMENT (OUTPATIENT)
Dept: ULTRASOUND IMAGING | Age: 22
End: 2022-05-26
Attending: PHYSICIAN ASSISTANT
Payer: COMMERCIAL

## 2022-05-26 ENCOUNTER — HOSPITAL ENCOUNTER (EMERGENCY)
Age: 22
Discharge: HOME | End: 2022-05-26
Attending: STUDENT IN AN ORGANIZED HEALTH CARE EDUCATION/TRAINING PROGRAM
Payer: COMMERCIAL

## 2022-05-26 VITALS
TEMPERATURE: 98 F | OXYGEN SATURATION: 99 % | HEART RATE: 84 BPM | DIASTOLIC BLOOD PRESSURE: 80 MMHG | RESPIRATION RATE: 16 BRPM | WEIGHT: 115 LBS | BODY MASS INDEX: 19.16 KG/M2 | HEIGHT: 65 IN | SYSTOLIC BLOOD PRESSURE: 108 MMHG

## 2022-05-26 DIAGNOSIS — O26.891 PELVIC PAIN AFFECTING PREGNANCY IN FIRST TRIMESTER, ANTEPARTUM: Primary | ICD-10-CM

## 2022-05-26 DIAGNOSIS — R10.2 PELVIC PAIN AFFECTING PREGNANCY IN FIRST TRIMESTER, ANTEPARTUM: Primary | ICD-10-CM

## 2022-05-26 DIAGNOSIS — Z87.59 HISTORY OF ECTOPIC PREGNANCY: ICD-10-CM

## 2022-05-26 LAB
ALBUMIN SERPL-MCNC: 3.8 G/DL (ref 3.4–5)
ALBUMIN/GLOB SERPL: 1.3 {RATIO} (ref 0.8–1.7)
ALP SERPL-CCNC: 62 U/L (ref 45–117)
ALT SERPL-CCNC: 25 U/L (ref 13–56)
ANION GAP SERPL CALC-SCNC: 5 MMOL/L (ref 3–18)
AST SERPL-CCNC: 15 U/L (ref 10–38)
BASOPHILS # BLD: 0.1 K/UL (ref 0–0.1)
BASOPHILS NFR BLD: 1 % (ref 0–2)
BILIRUB SERPL-MCNC: 0.6 MG/DL (ref 0.2–1)
BUN SERPL-MCNC: 12 MG/DL (ref 7–18)
BUN/CREAT SERPL: 19 (ref 12–20)
CALCIUM SERPL-MCNC: 8.5 MG/DL (ref 8.5–10.1)
CHLORIDE SERPL-SCNC: 105 MMOL/L (ref 100–111)
CO2 SERPL-SCNC: 29 MMOL/L (ref 21–32)
CREAT SERPL-MCNC: 0.64 MG/DL (ref 0.6–1.3)
DIFFERENTIAL METHOD BLD: NORMAL
EOSINOPHIL # BLD: 0.3 K/UL (ref 0–0.4)
EOSINOPHIL NFR BLD: 3 % (ref 0–5)
ERYTHROCYTE [DISTWIDTH] IN BLOOD BY AUTOMATED COUNT: 12.3 % (ref 11.6–14.5)
GLOBULIN SER CALC-MCNC: 3 G/DL (ref 2–4)
GLUCOSE SERPL-MCNC: 79 MG/DL (ref 74–99)
HCG SERPL-ACNC: 3035 MIU/ML (ref 0–10)
HCT VFR BLD AUTO: 39.9 % (ref 35–45)
HGB BLD-MCNC: 13.1 G/DL (ref 12–16)
IMM GRANULOCYTES # BLD AUTO: 0 K/UL (ref 0–0.04)
IMM GRANULOCYTES NFR BLD AUTO: 0 % (ref 0–0.5)
LYMPHOCYTES # BLD: 2 K/UL (ref 0.9–3.6)
LYMPHOCYTES NFR BLD: 23 % (ref 21–52)
MCH RBC QN AUTO: 28.6 PG (ref 24–34)
MCHC RBC AUTO-ENTMCNC: 32.8 G/DL (ref 31–37)
MCV RBC AUTO: 87.1 FL (ref 78–100)
MONOCYTES # BLD: 0.6 K/UL (ref 0.05–1.2)
MONOCYTES NFR BLD: 7 % (ref 3–10)
NEUTS SEG # BLD: 5.7 K/UL (ref 1.8–8)
NEUTS SEG NFR BLD: 66 % (ref 40–73)
NRBC # BLD: 0 K/UL (ref 0–0.01)
NRBC BLD-RTO: 0 PER 100 WBC
PLATELET # BLD AUTO: 219 K/UL (ref 135–420)
PMV BLD AUTO: 9.9 FL (ref 9.2–11.8)
POTASSIUM SERPL-SCNC: 3.5 MMOL/L (ref 3.5–5.5)
PROT SERPL-MCNC: 6.8 G/DL (ref 6.4–8.2)
RBC # BLD AUTO: 4.58 M/UL (ref 4.2–5.3)
SODIUM SERPL-SCNC: 139 MMOL/L (ref 136–145)
WBC # BLD AUTO: 8.6 K/UL (ref 4.6–13.2)

## 2022-05-26 PROCEDURE — 84702 CHORIONIC GONADOTROPIN TEST: CPT

## 2022-05-26 PROCEDURE — 80053 COMPREHEN METABOLIC PANEL: CPT

## 2022-05-26 PROCEDURE — 85025 COMPLETE CBC W/AUTO DIFF WBC: CPT

## 2022-05-26 PROCEDURE — 99284 EMERGENCY DEPT VISIT MOD MDM: CPT

## 2022-05-26 PROCEDURE — 74011250636 HC RX REV CODE- 250/636: Performed by: PHYSICIAN ASSISTANT

## 2022-05-26 PROCEDURE — 76817 TRANSVAGINAL US OBSTETRIC: CPT

## 2022-05-26 RX ADMIN — SODIUM CHLORIDE, POTASSIUM CHLORIDE, SODIUM LACTATE AND CALCIUM CHLORIDE 1000 ML: 600; 310; 30; 20 INJECTION, SOLUTION INTRAVENOUS at 11:03

## 2022-05-26 NOTE — ED TRIAGE NOTES
Pt arrives ambulatory to ED for repeat B-HCG levels, and an US, pt sts she is approx 5 weeks pregnant, pt A2, pt with pmhx of ectopic, LMP 22, pt with unconfirmed IUP, pt sts vaginal bleeding has stopped

## 2022-05-26 NOTE — ED PROVIDER NOTES
EMERGENCY DEPARTMENT HISTORY AND PHYSICAL EXAM    Date: 5/26/2022  Patient Name: Moses Sam    History of Presenting Illness     Chief Complaint   Patient presents with   [facility]    Ultrasound         History Provided By: Patient    Chief Complaint: labs    HPI(Context):   9:50 AM  Moses Sam is a 22 y.o. female with PMHX of HSV who presents to the emergency department C/O pelvic pain. Pain is mostly left sided and described as \"mild. \" Pt is G3 with hx of right sided ectopic 4 months ago with right salpingectomy by Dr. Dipak Huff. Pt notes LMP 04/20/2022. Pt was seen here 5-6 days ago with . Pt was told to RTED for imaging and serial HCG. Pt has not had US. Pt has appt with OB in June. Pt had pelvic exam at [facility] ED last week. Dx with UTI. On Macrobid. Pt denies fever, chills, vaginal bleeding, back pain, and any other sxs or complaints. PCP: None        Past History     Past Medical History:  Past Medical History:   Diagnosis Date    Herpes genitalia 2019       Past Surgical History:  No past surgical history on file. Family History:  No family history on file. Social History:  Social History     Tobacco Use    Smoking status: Never Smoker    Smokeless tobacco: Current User   Vaping Use    Vaping Use: Never used   Substance Use Topics    Alcohol use: Yes    Drug use: Never       Allergies: Allergies   Allergen Reactions    Penicillins Anaphylaxis and Hives    Cephalexin Hives         Review of Systems   Review of Systems   Constitutional: Negative for chills and fever. Gastrointestinal: Positive for nausea. Negative for vomiting. Genitourinary: Positive for pelvic pain. Negative for dysuria and vaginal bleeding. Musculoskeletal: Negative for back pain. All other systems reviewed and are negative.       Physical Exam     Vitals:    05/26/22 0942   BP: (!) 94/57   Pulse: 96   Resp: 16   Temp: 98 °F (36.7 °C)   SpO2: 99%   Weight: 52.2 kg (115 lb)   Height: 5' 5\" (1.651 m) Physical Exam  Vitals and nursing note reviewed. Constitutional:       General: She is not in acute distress. Appearance: She is well-developed. She is not diaphoretic. Comments:  female in NAD. Alert. SO at bedside. In fast track   HENT:      Head: Normocephalic and atraumatic. Right Ear: External ear normal.      Left Ear: External ear normal.      Nose: Nose normal.   Eyes:      General: No scleral icterus. Right eye: No discharge. Left eye: No discharge. Conjunctiva/sclera: Conjunctivae normal.   Cardiovascular:      Rate and Rhythm: Normal rate and regular rhythm. Heart sounds: Normal heart sounds. No murmur heard. No friction rub. No gallop. Pulmonary:      Effort: Pulmonary effort is normal. No tachypnea, accessory muscle usage or respiratory distress. Breath sounds: Normal breath sounds. No decreased breath sounds, wheezing, rhonchi or rales. Abdominal:      Palpations: Abdomen is soft. Tenderness: There is no abdominal tenderness. There is no right CVA tenderness, left CVA tenderness, guarding or rebound. Negative signs include McBurney's sign. Genitourinary:     Comments: Deferred as performed one week ago  Musculoskeletal:         General: Normal range of motion. Cervical back: Normal range of motion. Skin:     General: Skin is warm and dry. Neurological:      Mental Status: She is alert and oriented to person, place, and time.    Psychiatric:         Judgment: Judgment normal.             Diagnostic Study Results     Labs -     Recent Results (from the past 12 hour(s))   BETA HCG, QT    Collection Time: 05/26/22  9:58 AM   Result Value Ref Range    Beta HCG, QT 3,035 (H) 0 - 10 MIU/ML   CBC WITH AUTOMATED DIFF    Collection Time: 05/26/22  9:58 AM   Result Value Ref Range    WBC 8.6 4.6 - 13.2 K/uL    RBC 4.58 4.20 - 5.30 M/uL    HGB 13.1 12.0 - 16.0 g/dL    HCT 39.9 35.0 - 45.0 %    MCV 87.1 78.0 - 100.0 FL    MCH 28.6 24.0 - 34.0 PG    MCHC 32.8 31.0 - 37.0 g/dL    RDW 12.3 11.6 - 14.5 %    PLATELET 555 193 - 866 K/uL    MPV 9.9 9.2 - 11.8 FL    NRBC 0.0 0  WBC    ABSOLUTE NRBC 0.00 0.00 - 0.01 K/uL    NEUTROPHILS 66 40 - 73 %    LYMPHOCYTES 23 21 - 52 %    MONOCYTES 7 3 - 10 %    EOSINOPHILS 3 0 - 5 %    BASOPHILS 1 0 - 2 %    IMMATURE GRANULOCYTES 0 0.0 - 0.5 %    ABS. NEUTROPHILS 5.7 1.8 - 8.0 K/UL    ABS. LYMPHOCYTES 2.0 0.9 - 3.6 K/UL    ABS. MONOCYTES 0.6 0.05 - 1.2 K/UL    ABS. EOSINOPHILS 0.3 0.0 - 0.4 K/UL    ABS. BASOPHILS 0.1 0.0 - 0.1 K/UL    ABS. IMM. GRANS. 0.0 0.00 - 0.04 K/UL    DF AUTOMATED     METABOLIC PANEL, COMPREHENSIVE    Collection Time: 05/26/22  9:58 AM   Result Value Ref Range    Sodium 139 136 - 145 mmol/L    Potassium 3.5 3.5 - 5.5 mmol/L    Chloride 105 100 - 111 mmol/L    CO2 29 21 - 32 mmol/L    Anion gap 5 3.0 - 18 mmol/L    Glucose 79 74 - 99 mg/dL    BUN 12 7.0 - 18 MG/DL    Creatinine 0.64 0.6 - 1.3 MG/DL    BUN/Creatinine ratio 19 12 - 20      GFR est AA >60 >60 ml/min/1.73m2    GFR est non-AA >60 >60 ml/min/1.73m2    Calcium 8.5 8.5 - 10.1 MG/DL    Bilirubin, total 0.6 0.2 - 1.0 MG/DL    ALT (SGPT) 25 13 - 56 U/L    AST (SGOT) 15 10 - 38 U/L    Alk. phosphatase 62 45 - 117 U/L    Protein, total 6.8 6.4 - 8.2 g/dL    Albumin 3.8 3.4 - 5.0 g/dL    Globulin 3.0 2.0 - 4.0 g/dL    A-G Ratio 1.3 0.8 - 1.7         Radiologic Studies    US OB TV W DOPPLER   Final Result      1. Pregnancy of unknown location. > 2 mm hypoechoic structure within the endometrium does not convincingly   represent a gestational sac. Dedicated clinical follow-up, trending of beta hCG   levels, and repeat ultrasound assessment to document viable IUP is recommended. 2. Normal blood flow to each ovary. Probable right ovarian corpus luteum. 3. Small quantity of free pelvic fluid.         CT Results  (Last 48 hours)    None        CXR Results  (Last 48 hours)    None          Medications given in the ED-  Medications   lactated ringers bolus infusion 1,000 mL (0 mL IntraVENous IV Completed 5/26/22 7083)         Medical Decision Making   I am the first provider for this patient. I reviewed the vital signs, available nursing notes, past medical history, past surgical history, family history and social history. Vital Signs-Reviewed the patient's vital signs. Pulse Oximetry Analysis - 99% on RA. NORMAL     Records Reviewed: Nursing Notes, Old Medical Records and Previous Laboratory Studies    Provider Notes (Medical Decision Making): ectopic, miscarriage, UTI, STI, ovarian cyst    Procedures:  Procedures    ED Course:   9:50 AM Initial assessment performed. The patients presenting problems have been discussed, and they are in agreement with the care plan formulated and outlined with them. I have encouraged them to ask questions as they arise throughout their visit. Diagnosis and Disposition       US non-diagnostic. HCG increased to 3035. Discussed ectopic precautions. Tylenol. Continue prenatals. FU with Dr. Herbert Walker. Reasons to RTED discussed with pt. All questions answered. Pt feels comfortable going home at this time. Pt expressed understanding and she agrees with plan. 1. Pelvic pain affecting pregnancy in first trimester, antepartum    2. History of ectopic pregnancy        PLAN:  1. D/C Home  2. Current Discharge Medication List      STOP taking these medications       ibuprofen (MOTRIN) 800 mg tablet Comments:   Reason for Stopping:             3.   Follow-up Information     Follow up With Specialties Details Why Contact Info    Buddy Kimbrough MD Obstetrics & Gynecology  [facility] and [address] [place] [address]. [id number]  [phone]      [facility] EMERGENCY DEPT Emergency Medicine  As needed, If symptoms worsen [address] [id number]  [phone]        _______________________________    Attestations:   This note is prepared by Elo Amos David Lopes PA-C.  _______________________________        Please note that this dictation was completed with [employer], the computer voice recognition software. Quite often unanticipated grammatical, syntax, homophones, and other interpretive errors are inadvertently transcribed by the computer software. Please disregard these errors. Please excuse any errors that have escaped final proofreading.

## 2022-12-28 ENCOUNTER — HOSPITAL ENCOUNTER (OUTPATIENT)
Age: 22
Discharge: HOME | End: 2022-12-29
Attending: STUDENT IN AN ORGANIZED HEALTH CARE EDUCATION/TRAINING PROGRAM | Admitting: STUDENT IN AN ORGANIZED HEALTH CARE EDUCATION/TRAINING PROGRAM
Payer: MEDICAID

## 2022-12-28 LAB
APPEARANCE UR: ABNORMAL
BILIRUB UR QL: NEGATIVE
COLOR UR: YELLOW
GLUCOSE UR QL STRIP.AUTO: NEGATIVE MG/DL
KETONES UR-MCNC: NEGATIVE MG/DL
LEUKOCYTE ESTERASE UR QL STRIP: ABNORMAL
NITRITE UR QL: NEGATIVE
PH UR: 6 (ref 5–9)
PROT UR QL: NEGATIVE MG/DL
RBC # UR STRIP: NEGATIVE
SERVICE CMNT-IMP: ABNORMAL
SP GR UR: <1.005 (ref 1–1.02)
UROBILINOGEN UR QL: 0.2 EU/DL (ref 0.2–1)

## 2022-12-28 PROCEDURE — 81003 URINALYSIS AUTO W/O SCOPE: CPT

## 2022-12-28 PROCEDURE — 96360 HYDRATION IV INFUSION INIT: CPT

## 2022-12-28 PROCEDURE — 59025 FETAL NON-STRESS TEST: CPT

## 2022-12-29 VITALS
BODY MASS INDEX: 27.49 KG/M2 | SYSTOLIC BLOOD PRESSURE: 109 MMHG | HEIGHT: 65 IN | WEIGHT: 165 LBS | RESPIRATION RATE: 22 BRPM | TEMPERATURE: 97.9 F | DIASTOLIC BLOOD PRESSURE: 81 MMHG

## 2022-12-29 PROBLEM — Z37.9 NORMAL LABOR: Status: ACTIVE | Noted: 2022-12-29

## 2022-12-29 PROCEDURE — 99283 EMERGENCY DEPT VISIT LOW MDM: CPT

## 2022-12-29 PROCEDURE — 96360 HYDRATION IV INFUSION INIT: CPT

## 2022-12-29 PROCEDURE — 59025 FETAL NON-STRESS TEST: CPT

## 2022-12-29 NOTE — PROGRESS NOTES
2200Pt arrived to [facility] triage with complaint of contractions. Patient is 35.2 EGA . Pt of Dr. Marlene Javier( APPLEG DEISY AKBARM managing). Pt denies Leakage of fluid,  HA, blurred vision, epigastric pain, or vaginal bleeding. Pt confirms fetal movement. Patient rates her pain 7/10. Pt placed in room 248 on post partum due to no beds available on L&D this RN remaining at bedside during entire admission to monitor FHR tracing. SVE closed/thick/high on admission. Pt kellie with moderate contractions every 3-5 min. LR bolus given per order from CNM for  labor. SVE by CNM after LR bolus and two hours. No change in cervix. Pt ruled out for labor. 0035 NST reactive. FHR tracing reviewed with KEENAN Forbes CNM. Pt discharged home with labor precautions and kick count instructions reviewed with patient. Opportunity given for questions. Pt states understanding. Pt refuses paper discharge instructions. Pt states Pain 7/10 at discharge.

## 2023-01-08 ENCOUNTER — HOSPITAL ENCOUNTER (OUTPATIENT)
Age: 23
Discharge: HOME | End: 2023-01-08
Attending: STUDENT IN AN ORGANIZED HEALTH CARE EDUCATION/TRAINING PROGRAM | Admitting: OBSTETRICS & GYNECOLOGY
Payer: MEDICAID

## 2023-01-08 VITALS — SYSTOLIC BLOOD PRESSURE: 116 MMHG | TEMPERATURE: 98.6 F | DIASTOLIC BLOOD PRESSURE: 71 MMHG | RESPIRATION RATE: 18 BRPM

## 2023-01-08 PROCEDURE — 99283 EMERGENCY DEPT VISIT LOW MDM: CPT

## 2023-01-08 NOTE — PROGRESS NOTES
0407- Pt ambulated onto L&D unit c/o ctx and possible ROM. Pt is 36w5d and is .     [id number]- Required documentation complete and EFMs applied to Pt. Pt VS WNL. 36- Amnisure negative. SVE 1.5//-3    0450- CNM notified of Pt results. Ordered to continue to observe Pt and monitor ctx. 0- CNM reviewed strip and gave dc orders. 2217- Pt disconnected from EFM. 0530- I have reviewed discharge instructions with the patient. The patient verbalized understanding. Time for questions was allowed and all questions were answered. 0535- Pt ambulated off of L&D unit.

## 2023-01-24 ENCOUNTER — HOSPITAL ENCOUNTER (INPATIENT)
Age: 23
LOS: 2 days | Discharge: HOME | DRG: 560 | End: 2023-01-26
Attending: OBSTETRICS & GYNECOLOGY | Admitting: OBSTETRICS & GYNECOLOGY
Payer: MEDICAID

## 2023-01-24 ENCOUNTER — ANESTHESIA (OUTPATIENT)
Dept: LABOR AND DELIVERY | Age: 23
DRG: 560 | End: 2023-01-24
Payer: MEDICAID

## 2023-01-24 ENCOUNTER — ANESTHESIA EVENT (OUTPATIENT)
Dept: LABOR AND DELIVERY | Age: 23
DRG: 560 | End: 2023-01-24
Payer: MEDICAID

## 2023-01-24 PROBLEM — Q27.0 SINGLE UMBILICAL ARTERY: Status: ACTIVE | Noted: 2023-01-24

## 2023-01-24 PROBLEM — Z3A.39 39 WEEKS GESTATION OF PREGNANCY: Status: ACTIVE | Noted: 2023-01-24

## 2023-01-24 LAB
ABO + RH BLD: NORMAL
BASOPHILS # BLD: 0.1 K/UL (ref 0–0.1)
BASOPHILS NFR BLD: 1 % (ref 0–2)
BLOOD GROUP ANTIBODIES SERPL: NORMAL
DIFFERENTIAL METHOD BLD: ABNORMAL
EOSINOPHIL # BLD: 0.2 K/UL (ref 0–0.4)
EOSINOPHIL NFR BLD: 2 % (ref 0–5)
ERYTHROCYTE [DISTWIDTH] IN BLOOD BY AUTOMATED COUNT: 15.6 % (ref 11.6–14.5)
HCT VFR BLD AUTO: 30 % (ref 35–45)
HGB BLD-MCNC: 9.1 G/DL (ref 12–16)
IMM GRANULOCYTES # BLD AUTO: 0.1 K/UL (ref 0–0.04)
IMM GRANULOCYTES NFR BLD AUTO: 1 % (ref 0–0.5)
LYMPHOCYTES # BLD: 1.7 K/UL (ref 0.9–3.6)
LYMPHOCYTES NFR BLD: 16 % (ref 21–52)
MCH RBC QN AUTO: 23.3 PG (ref 24–34)
MCHC RBC AUTO-ENTMCNC: 30.3 G/DL (ref 31–37)
MCV RBC AUTO: 76.9 FL (ref 78–100)
MONOCYTES # BLD: 0.8 K/UL (ref 0.05–1.2)
MONOCYTES NFR BLD: 8 % (ref 3–10)
NEUTS SEG # BLD: 7.7 K/UL (ref 1.8–8)
NEUTS SEG NFR BLD: 72 % (ref 40–73)
NRBC # BLD: 0 K/UL (ref 0–0.01)
NRBC BLD-RTO: 0 PER 100 WBC
PLATELET # BLD AUTO: 236 K/UL (ref 135–420)
PMV BLD AUTO: 9.9 FL (ref 9.2–11.8)
RBC # BLD AUTO: 3.9 M/UL (ref 4.2–5.3)
SPECIMEN EXP DATE BLD: NORMAL
WBC # BLD AUTO: 10.6 K/UL (ref 4.6–13.2)

## 2023-01-24 PROCEDURE — 75410000002 HC LABOR FEE PER 1 HR

## 2023-01-24 PROCEDURE — 74011250636 HC RX REV CODE- 250/636: Performed by: REGISTERED NURSE

## 2023-01-24 PROCEDURE — 85025 COMPLETE CBC W/AUTO DIFF WBC: CPT

## 2023-01-24 PROCEDURE — 77030007879 HC KT SPN EPDRL TELE -B: Performed by: REGISTERED NURSE

## 2023-01-24 PROCEDURE — 74011250636 HC RX REV CODE- 250/636

## 2023-01-24 PROCEDURE — 74011000250 HC RX REV CODE- 250: Performed by: REGISTERED NURSE

## 2023-01-24 PROCEDURE — 65270000029 HC RM PRIVATE

## 2023-01-24 PROCEDURE — 74011250636 HC RX REV CODE- 250/636: Performed by: OBSTETRICS & GYNECOLOGY

## 2023-01-24 PROCEDURE — 74011000258 HC RX REV CODE- 258: Performed by: REGISTERED NURSE

## 2023-01-24 PROCEDURE — 86900 BLOOD TYPING SEROLOGIC ABO: CPT

## 2023-01-24 RX ORDER — FENTANYL CITRATE 50 UG/ML
INJECTION, SOLUTION INTRAMUSCULAR; INTRAVENOUS AS NEEDED
Status: DISCONTINUED | OUTPATIENT
Start: 2023-01-24 | End: 2023-01-25 | Stop reason: HOSPADM

## 2023-01-24 RX ORDER — LIDOCAINE HYDROCHLORIDE 10 MG/ML
20 INJECTION, SOLUTION EPIDURAL; INFILTRATION; INTRACAUDAL; PERINEURAL AS NEEDED
Status: DISCONTINUED | OUTPATIENT
Start: 2023-01-24 | End: 2023-01-25 | Stop reason: HOSPADM

## 2023-01-24 RX ORDER — OXYTOCIN/RINGER'S LACTATE 30/500 ML
10 PLASTIC BAG, INJECTION (ML) INTRAVENOUS AS NEEDED
Status: COMPLETED | OUTPATIENT
Start: 2023-01-24 | End: 2023-01-25

## 2023-01-24 RX ORDER — FENTANYL CITRATE 50 UG/ML
100 INJECTION, SOLUTION INTRAMUSCULAR; INTRAVENOUS ONCE
Status: COMPLETED | OUTPATIENT
Start: 2023-01-24 | End: 2023-01-24

## 2023-01-24 RX ORDER — METHYLERGONOVINE MALEATE 0.2 MG/ML
0.2 INJECTION INTRAVENOUS AS NEEDED
Status: DISCONTINUED | OUTPATIENT
Start: 2023-01-24 | End: 2023-01-25 | Stop reason: HOSPADM

## 2023-01-24 RX ORDER — HYDROMORPHONE HYDROCHLORIDE 1 MG/ML
1 INJECTION, SOLUTION INTRAMUSCULAR; INTRAVENOUS; SUBCUTANEOUS
Status: DISCONTINUED | OUTPATIENT
Start: 2023-01-24 | End: 2023-01-25 | Stop reason: HOSPADM

## 2023-01-24 RX ORDER — FENTANYL CITRATE 50 UG/ML
INJECTION, SOLUTION INTRAMUSCULAR; INTRAVENOUS
Status: COMPLETED
Start: 2023-01-24 | End: 2023-01-24

## 2023-01-24 RX ORDER — ROPIVACAINE HYDROCHLORIDE 2 MG/ML
INJECTION, SOLUTION EPIDURAL; INFILTRATION; PERINEURAL AS NEEDED
Status: DISCONTINUED | OUTPATIENT
Start: 2023-01-24 | End: 2023-01-25 | Stop reason: HOSPADM

## 2023-01-24 RX ORDER — OXYTOCIN/0.9 % SODIUM CHLORIDE 30/500 ML
87.3 PLASTIC BAG, INJECTION (ML) INTRAVENOUS AS NEEDED
Status: DISCONTINUED | OUTPATIENT
Start: 2023-01-24 | End: 2023-01-25 | Stop reason: HOSPADM

## 2023-01-24 RX ORDER — MINERAL OIL
30 OIL (ML) ORAL AS NEEDED
Status: COMPLETED | OUTPATIENT
Start: 2023-01-24 | End: 2023-01-25

## 2023-01-24 RX ORDER — OXYTOCIN/0.9 % SODIUM CHLORIDE 30/500 ML
0-20 PLASTIC BAG, INJECTION (ML) INTRAVENOUS
Status: DISCONTINUED | OUTPATIENT
Start: 2023-01-24 | End: 2023-01-25 | Stop reason: HOSPADM

## 2023-01-24 RX ORDER — FENTANYL/ROPIVACAINE/NS/PF 2MCG/ML-.1
1-15 PLASTIC BAG, INJECTION (ML) EPIDURAL CONTINUOUS
Status: DISCONTINUED | OUTPATIENT
Start: 2023-01-24 | End: 2023-01-25 | Stop reason: HOSPADM

## 2023-01-24 RX ORDER — LIDOCAINE HYDROCHLORIDE AND EPINEPHRINE 20; 5 MG/ML; UG/ML
INJECTION, SOLUTION EPIDURAL; INFILTRATION; INTRACAUDAL; PERINEURAL AS NEEDED
Status: DISCONTINUED | OUTPATIENT
Start: 2023-01-24 | End: 2023-01-25 | Stop reason: HOSPADM

## 2023-01-24 RX ORDER — TERBUTALINE SULFATE 1 MG/ML
0.25 INJECTION SUBCUTANEOUS
Status: DISCONTINUED | OUTPATIENT
Start: 2023-01-24 | End: 2023-01-25 | Stop reason: HOSPADM

## 2023-01-24 RX ORDER — NALBUPHINE HYDROCHLORIDE 10 MG/ML
10 INJECTION, SOLUTION INTRAMUSCULAR; INTRAVENOUS; SUBCUTANEOUS
Status: DISCONTINUED | OUTPATIENT
Start: 2023-01-24 | End: 2023-01-25 | Stop reason: HOSPADM

## 2023-01-24 RX ORDER — BUTORPHANOL TARTRATE 2 MG/ML
2 INJECTION INTRAMUSCULAR; INTRAVENOUS
Status: DISCONTINUED | OUTPATIENT
Start: 2023-01-24 | End: 2023-01-25 | Stop reason: HOSPADM

## 2023-01-24 RX ORDER — FENTANYL/ROPIVACAINE/NS/PF 2MCG/ML-.1
PLASTIC BAG, INJECTION (ML) EPIDURAL
Status: DISPENSED
Start: 2023-01-24 | End: 2023-01-25

## 2023-01-24 RX ORDER — NALOXONE HYDROCHLORIDE 0.4 MG/ML
0.2 INJECTION, SOLUTION INTRAMUSCULAR; INTRAVENOUS; SUBCUTANEOUS AS NEEDED
Status: DISCONTINUED | OUTPATIENT
Start: 2023-01-24 | End: 2023-01-25 | Stop reason: HOSPADM

## 2023-01-24 RX ORDER — SODIUM CHLORIDE 0.9 % (FLUSH) 0.9 %
5-40 SYRINGE (ML) INJECTION AS NEEDED
Status: DISCONTINUED | OUTPATIENT
Start: 2023-01-24 | End: 2023-01-25 | Stop reason: HOSPADM

## 2023-01-24 RX ORDER — SODIUM CHLORIDE, SODIUM LACTATE, POTASSIUM CHLORIDE, CALCIUM CHLORIDE 600; 310; 30; 20 MG/100ML; MG/100ML; MG/100ML; MG/100ML
125 INJECTION, SOLUTION INTRAVENOUS CONTINUOUS
Status: DISCONTINUED | OUTPATIENT
Start: 2023-01-24 | End: 2023-01-25 | Stop reason: HOSPADM

## 2023-01-24 RX ORDER — LIDOCAINE HYDROCHLORIDE 10 MG/ML
INJECTION INFILTRATION; PERINEURAL AS NEEDED
Status: DISCONTINUED | OUTPATIENT
Start: 2023-01-24 | End: 2023-01-25 | Stop reason: HOSPADM

## 2023-01-24 RX ORDER — PHENYLEPHRINE HCL IN 0.9% NACL 1 MG/10 ML
80 SYRINGE (ML) INTRAVENOUS AS NEEDED
Status: DISCONTINUED | OUTPATIENT
Start: 2023-01-24 | End: 2023-01-25 | Stop reason: HOSPADM

## 2023-01-24 RX ORDER — SODIUM CHLORIDE 0.9 % (FLUSH) 0.9 %
5-40 SYRINGE (ML) INJECTION EVERY 8 HOURS
Status: DISCONTINUED | OUTPATIENT
Start: 2023-01-24 | End: 2023-01-25 | Stop reason: HOSPADM

## 2023-01-24 RX ORDER — ONDANSETRON 2 MG/ML
4 INJECTION INTRAMUSCULAR; INTRAVENOUS
Status: DISCONTINUED | OUTPATIENT
Start: 2023-01-24 | End: 2023-01-25 | Stop reason: HOSPADM

## 2023-01-24 RX ADMIN — FENTANYL CITRATE 100 MCG: 50 INJECTION, SOLUTION INTRAMUSCULAR; INTRAVENOUS at 20:34

## 2023-01-24 RX ADMIN — SODIUM CHLORIDE, POTASSIUM CHLORIDE, SODIUM LACTATE AND CALCIUM CHLORIDE 125 ML/HR: 600; 310; 30; 20 INJECTION, SOLUTION INTRAVENOUS at 09:16

## 2023-01-24 RX ADMIN — LIDOCAINE HYDROCHLORIDE,EPINEPHRINE BITARTRATE 3 ML: 20; .005 INJECTION, SOLUTION EPIDURAL; INFILTRATION; INTRACAUDAL; PERINEURAL at 20:34

## 2023-01-24 RX ADMIN — FENTANYL CITRATE 12 ML/HR: 0.05 INJECTION, SOLUTION INTRAMUSCULAR; INTRAVENOUS at 20:37

## 2023-01-24 RX ADMIN — SODIUM CHLORIDE, POTASSIUM CHLORIDE, SODIUM LACTATE AND CALCIUM CHLORIDE 125 ML/HR: 600; 310; 30; 20 INJECTION, SOLUTION INTRAVENOUS at 21:00

## 2023-01-24 RX ADMIN — Medication 2 MILLI-UNITS/MIN: at 13:02

## 2023-01-24 RX ADMIN — SODIUM CHLORIDE, POTASSIUM CHLORIDE, SODIUM LACTATE AND CALCIUM CHLORIDE 125 ML/HR: 600; 310; 30; 20 INJECTION, SOLUTION INTRAVENOUS at 16:03

## 2023-01-24 RX ADMIN — ROPIVACAINE HYDROCHLORIDE 8 ML: 2 INJECTION EPIDURAL; INFILTRATION; PERINEURAL at 20:34

## 2023-01-24 RX ADMIN — LIDOCAINE HYDROCHLORIDE 5 ML: 10 INJECTION, SOLUTION INFILTRATION; PERINEURAL at 20:21

## 2023-01-24 RX ADMIN — FENTANYL CITRATE 100 MCG: 50 INJECTION, SOLUTION INTRAMUSCULAR; INTRAVENOUS at 20:35

## 2023-01-24 RX ADMIN — LIDOCAINE HYDROCHLORIDE 5 ML: 10 INJECTION, SOLUTION INFILTRATION; PERINEURAL at 20:52

## 2023-01-24 NOTE — PROGRESS NOTES
Problem: Vaginal Delivery: Day of Deliver-Laboring  Goal: Activity/Safety  1/24/2023 1002 by Shaneka Steinberg RN  Outcome: Progressing Towards Goal  1/24/2023 1002 by Shaneka Steinberg RN  Outcome: Progressing Towards Goal  Goal: Consults, if ordered  1/24/2023 1002 by Shaneka Steinberg RN  Outcome: Progressing Towards Goal  1/24/2023 1002 by Shaneka Steinberg RN  Outcome: Progressing Towards Goal  Goal: Diagnostic Test/Procedures  1/24/2023 1002 by Shaneka Steinberg RN  Outcome: Progressing Towards Goal  1/24/2023 1002 by Shaneka Steinberg RN  Outcome: Progressing Towards Goal  Goal: Nutrition/Diet  1/24/2023 1002 by Shaneka Steinberg RN  Outcome: Progressing Towards Goal  1/24/2023 1002 by Shaneka Steinberg RN  Outcome: Progressing Towards Goal  Goal: Discharge Planning  1/24/2023 1002 by Shaneka Steinberg RN  Outcome: Progressing Towards Goal  1/24/2023 1002 by Shaneka Steinberg RN  Outcome: Progressing Towards Goal  Goal: Medications  1/24/2023 1002 by Shaneka Steinberg RN  Outcome: Progressing Towards Goal  1/24/2023 1002 by Shaneka Steinberg RN  Outcome: Progressing Towards Goal  Goal: Respiratory  1/24/2023 1002 by Shaneka Steinberg RN  Outcome: Progressing Towards Goal  1/24/2023 1002 by Shaneka Steinberg RN  Outcome: Progressing Towards Goal  Goal: Treatments/Interventions/Procedures  1/24/2023 1002 by Shaneka Steinberg RN  Outcome: Progressing Towards Goal  1/24/2023 1002 by Shaneka Steinberg RN  Outcome: Progressing Towards Goal  Goal: *Vital signs within defined limits  1/24/2023 1002 by Shaneka Steinberg RN  Outcome: Progressing Towards Goal  1/24/2023 1002 by Shaneka Steinberg RN  Outcome: Progressing Towards Goal  Goal: *Labs within defined limits  1/24/2023 1002 by Shaneka Steinberg RN  Outcome: Progressing Towards Goal  1/24/2023 1002 by Shaneka Steinberg RN  Outcome: Progressing Towards Goal  Goal: *Hemodynamically stable  1/24/2023 1002 by Shaneka Steinberg RN  Outcome: Progressing Towards Goal  1/24/2023 1002 by Author Valier, RN  Outcome: Progressing Towards Goal  Goal: *Optimal pain control at patient's stated goal  1/24/2023 1002 by Author Denny RN  Outcome: Progressing Towards Goal  1/24/2023 1002 by Author Baldwin RN  Outcome: Progressing Towards Goal     Problem: Pain  Goal: *Control of Pain  1/24/2023 1002 by Author Denny RN  Outcome: Progressing Towards Goal  1/24/2023 1002 by Author Baldwin RN  Outcome: Progressing Towards Goal  Goal: *PALLIATIVE CARE:  Alleviation of Pain  1/24/2023 1002 by Author Denny RN  Outcome: Progressing Towards Goal  1/24/2023 1002 by Author Baldwin RN  Outcome: Progressing Towards Goal

## 2023-01-24 NOTE — PROGRESS NOTES
Pt presents to unit for induction of labor d/t 2 vessel cord and possible velamentous cord insertion. Taken to room 4 for induction. 1058- Dr. Nate Ferrera at bedside. Reviewed POC and patient in agreement. SVE and AROM. Pt sat high colunga's. Per MD, will allow patient a few hours to monitor contractions prior to initiating Pitocin. No other questions or concerns. 0950- Pt bouncing on birthing ball.     1401- Wireless monitors applied and patient up, ambulating in room. 1544- Pt resting in bed. Declines position change at this time. 1920- Pt leaning over bedside. EFM and TOCO adjusted. 1938- Pt requesting pain medication. Discussed Stadol vs epidural at this stage. Pt requests epidural. IV bolus started. 2000- S. May-Such, CRNA at bedside explaining epidural.     2010- Fentanyl vial handed to CRNA. 2015- Bedside and Verbal shift change report given to Edinson Hayes RN (oncoming nurse) by ALONZO Bermudez RN (offgoing nurse). Report included the following information SBAR, Intake/Output, MAR, and Recent Results.

## 2023-01-24 NOTE — H&P
History & Physical    Name: Jacob Galloway MRN: [id number]  SSN: [id number]    YOB: 2000  Age: 25 y.o. Sex: female      Subjective:     Estimated Date of Delivery: 23  OB History    Para Term  AB Living   3 0 0 0 0 0   SAB IAB Ectopic Molar Multiple Live Births   0 0 0 0 0 0      # Outcome Date GA Lbr Cayden/2nd Weight Sex Delivery Anes PTL Lv   3 Current            2             1                 Ms. Olimpia Dailey is admitted with pregnancy at 39w0d for induction of labor due to favorable cervix at term. Prenatal course was complicated by  2 vessel cord . Please see prenatal records for details. Past Medical History:   Diagnosis Date    Psychiatric problem      Past Surgical History:   Procedure Laterality Date    HX OTHER SURGICAL Right     fallopian tube removal     Social History     Occupational History    Not on file   Tobacco Use    Smoking status: Never    Smokeless tobacco: Current   Vaping Use    Vaping Use: Never used   Substance and Sexual Activity    Alcohol use: Not Currently    Drug use: Never    Sexual activity: Yes     Partners: Male     Birth control/protection: Implant     Comment: Nexplanon- inserted 2020 (outside clinic)     History reviewed. No pertinent family history. Allergies   Allergen Reactions    Penicillins Anaphylaxis and Hives    Cephalexin Hives     Prior to Admission medications    Medication Sig Start Date End Date Taking? Authorizing Provider   PNV Comb #2-Iron-Omega 3-FA [id number] mg cmpk Take  by mouth. Yes Provider, Historical        Review of Systems: Pertinent items are noted in the History of Present Illness. Objective:     Vitals:  Vitals:    23 0830 23 0840   BP:  123/77   Pulse:  85   Resp:  18   Temp:  98.4 °F (36.9 °C)   SpO2:  98%   Weight: 80.3 kg (177 lb)    Height: 5' 5\" (1.651 m)         Physical Exam:  Patient without distress. Abdomen: soft, nontender, EFW 6 1/2 lbs.   Cervical Exam: 3 cm dilate/80/-1/posterior    Membranes:  Artificial Rupture of Membranes; Amniotic Fluid: medium amount of clear fluid  Fetal Heart Rate: Reactive          Prenatal Labs:   Lab Results   Component Value Date/Time    ABO/Rh(D) A POSITIVE 05/21/2022 02:48 AM       Impression/Plan:     Principal Problem:    Single umbilical artery (4/11/5362)    Active Problems:    39 weeks gestation of pregnancy (1/24/2023)         Plan: Admit for induction of labor. Group B Strep negative. Will see what happens to contraction pattern after AROM before starting pitocin.

## 2023-01-25 PROCEDURE — 88307 TISSUE EXAM BY PATHOLOGIST: CPT

## 2023-01-25 PROCEDURE — 76060000078 HC EPIDURAL ANESTHESIA

## 2023-01-25 PROCEDURE — 00HU33Z INSERTION OF INFUSION DEVICE INTO SPINAL CANAL, PERCUTANEOUS APPROACH: ICD-10-PCS | Performed by: OBSTETRICS & GYNECOLOGY

## 2023-01-25 PROCEDURE — 74011250637 HC RX REV CODE- 250/637

## 2023-01-25 PROCEDURE — 74011000258 HC RX REV CODE- 258: Performed by: REGISTERED NURSE

## 2023-01-25 PROCEDURE — 74011250636 HC RX REV CODE- 250/636: Performed by: REGISTERED NURSE

## 2023-01-25 PROCEDURE — 74011250637 HC RX REV CODE- 250/637: Performed by: MIDWIFE

## 2023-01-25 PROCEDURE — 10907ZC DRAINAGE OF AMNIOTIC FLUID, THERAPEUTIC FROM PRODUCTS OF CONCEPTION, VIA NATURAL OR ARTIFICIAL OPENING: ICD-10-PCS | Performed by: OBSTETRICS & GYNECOLOGY

## 2023-01-25 PROCEDURE — 75410000003 HC RECOV DEL/VAG/CSECN EA 0.5 HR

## 2023-01-25 PROCEDURE — 75410000002 HC LABOR FEE PER 1 HR

## 2023-01-25 PROCEDURE — 75410000000 HC DELIVERY VAGINAL/SINGLE

## 2023-01-25 PROCEDURE — 74011250636 HC RX REV CODE- 250/636: Performed by: OBSTETRICS & GYNECOLOGY

## 2023-01-25 PROCEDURE — 74011250636 HC RX REV CODE- 250/636: Performed by: MIDWIFE

## 2023-01-25 PROCEDURE — 74011250637 HC RX REV CODE- 250/637: Performed by: OBSTETRICS & GYNECOLOGY

## 2023-01-25 PROCEDURE — 0HQ9XZZ REPAIR PERINEUM SKIN, EXTERNAL APPROACH: ICD-10-PCS | Performed by: OBSTETRICS & GYNECOLOGY

## 2023-01-25 PROCEDURE — 65270000029 HC RM PRIVATE

## 2023-01-25 PROCEDURE — 4A1HXCZ MONITORING OF PRODUCTS OF CONCEPTION, CARDIAC RATE, EXTERNAL APPROACH: ICD-10-PCS | Performed by: OBSTETRICS & GYNECOLOGY

## 2023-01-25 RX ORDER — MINERAL OIL
OIL (ML) ORAL
Status: COMPLETED
Start: 2023-01-25 | End: 2023-01-25

## 2023-01-25 RX ORDER — ACETAMINOPHEN 325 MG/1
650 TABLET ORAL
Status: DISCONTINUED | OUTPATIENT
Start: 2023-01-25 | End: 2023-01-26 | Stop reason: HOSPADM

## 2023-01-25 RX ORDER — CLINDAMYCIN PHOSPHATE 900 MG/50ML
900 INJECTION, SOLUTION INTRAVENOUS ONCE
Status: COMPLETED | OUTPATIENT
Start: 2023-01-25 | End: 2023-01-25

## 2023-01-25 RX ORDER — PROMETHAZINE HYDROCHLORIDE 25 MG/ML
25 INJECTION, SOLUTION INTRAMUSCULAR; INTRAVENOUS
Status: DISCONTINUED | OUTPATIENT
Start: 2023-01-25 | End: 2023-01-26 | Stop reason: HOSPADM

## 2023-01-25 RX ORDER — FENTANYL CITRATE 50 UG/ML
INJECTION, SOLUTION INTRAMUSCULAR; INTRAVENOUS
Status: DISPENSED
Start: 2023-01-25 | End: 2023-01-25

## 2023-01-25 RX ORDER — IBUPROFEN 400 MG/1
800 TABLET ORAL
Status: DISCONTINUED | OUTPATIENT
Start: 2023-01-25 | End: 2023-01-26 | Stop reason: HOSPADM

## 2023-01-25 RX ORDER — IBUPROFEN 800 MG/1
800 TABLET ORAL
Qty: 60 TABLET | Refills: 1 | Status: SHIPPED | OUTPATIENT
Start: 2023-01-25

## 2023-01-25 RX ORDER — AMOXICILLIN 250 MG
1 CAPSULE ORAL
Status: DISCONTINUED | OUTPATIENT
Start: 2023-01-25 | End: 2023-01-26 | Stop reason: HOSPADM

## 2023-01-25 RX ORDER — LANOLIN ALCOHOL/MO/W.PET/CERES
1 CREAM (GRAM) TOPICAL
Status: DISCONTINUED | OUTPATIENT
Start: 2023-01-26 | End: 2023-01-26 | Stop reason: HOSPADM

## 2023-01-25 RX ORDER — LANOLIN ALCOHOL/MO/W.PET/CERES
3 CREAM (GRAM) TOPICAL
Status: DISCONTINUED | OUTPATIENT
Start: 2023-01-25 | End: 2023-01-26 | Stop reason: HOSPADM

## 2023-01-25 RX ORDER — FENTANYL CITRATE 50 UG/ML
50 INJECTION, SOLUTION INTRAMUSCULAR; INTRAVENOUS ONCE
Status: COMPLETED | OUTPATIENT
Start: 2023-01-25 | End: 2023-01-25

## 2023-01-25 RX ADMIN — IBUPROFEN 800 MG: 400 TABLET, FILM COATED ORAL at 21:58

## 2023-01-25 RX ADMIN — CLINDAMYCIN PHOSPHATE 900 MG: 900 INJECTION, SOLUTION INTRAVENOUS at 04:42

## 2023-01-25 RX ADMIN — IBUPROFEN 800 MG: 400 TABLET, FILM COATED ORAL at 13:18

## 2023-01-25 RX ADMIN — FENTANYL CITRATE 50 MCG: 50 INJECTION, SOLUTION INTRAMUSCULAR; INTRAVENOUS at 03:57

## 2023-01-25 RX ADMIN — Medication 10000 MILLI-UNITS: at 03:49

## 2023-01-25 RX ADMIN — MINERAL OIL 30 ML: 1000 SOLUTION ORAL at 03:30

## 2023-01-25 RX ADMIN — ONDANSETRON 4 MG: 2 INJECTION INTRAMUSCULAR; INTRAVENOUS at 02:56

## 2023-01-25 RX ADMIN — FENTANYL CITRATE 12 ML/HR: 0.05 INJECTION, SOLUTION INTRAMUSCULAR; INTRAVENOUS at 02:11

## 2023-01-25 RX ADMIN — IBUPROFEN 800 MG: 400 TABLET, FILM COATED ORAL at 05:58

## 2023-01-25 RX ADMIN — MINERAL OIL 30 ML: 1000 SOLUTION ORAL at 02:30

## 2023-01-25 NOTE — ANESTHESIA PROCEDURE NOTES
Epidural Block    Patient location during procedure: OB  Start time: 1/24/2023 8:01 PM  End time: 1/24/2023 8:35 PM  Reason for block: labor epidural  Staffing  Performed: CRNA   Anesthesiologist: Joelle Agarwal MD  Resident/CRNA: Bunny Schwartz CRNA  Preanesthetic Checklist  Completed: patient identified, IV checked, site marked, risks and benefits discussed, surgical consent, monitors and equipment checked, pre-op evaluation, timeout performed and fire risk safety assessment completed and verbalized  Block Placement  Patient position: sitting  Prep: ChloraPrep  Sterility prep: mask, hand, gloves, drape and cap  Sedation level: no sedation  Patient monitoring: heart rate, frequent blood pressure checks and ETCO2  Approach: midline  Location: lumbar  Lumbar location: L3-L4  Epidural  Loss of resistance technique: saline  Guidance: landmark technique and ultrasound guided  Needle  Needle type: Tuohy   Needle gauge: 17 G  Needle length: 9 cm  Needle insertion depth: 7 cm  Catheter type: end hole  Catheter size: 19 G  Catheter at skin depth: 12 cm  Catheter securement method: clear occlusive dressing, liquid medical adhesive and surgical tape  Test dose: negative  Assessment  Number of attempts: 3 or more  Procedure assessment: patient tolerated procedure well with no immediate complications  Additional Notes  Multiple attempts due to patient moving frequently and not sitting still.

## 2023-01-25 NOTE — PROGRESS NOTES
9273- Bedside, Verbal, and Written shift change report given to Antionette Lewis RN (oncoming nurse) by Trisha Merida RN (offgoing nurse). Report included the following information SBAR, ED Summary, Procedure Summary, Intake/Output, MAR, Recent Results, Med Rec Status, and Quality Measures.

## 2023-01-25 NOTE — PROGRESS NOTES
Bedside and Verbal shift change report given to Princess Oneal RN (oncoming nurse) by Madie Dahl RN (offgoing nurse). Report included the following information SBAR, Kardex, Procedure Summary, Intake/Output, MAR, and Recent Results.

## 2023-01-25 NOTE — PROGRESS NOTES
Fetal strip continuously monitored and reviwed by CNM. See centrity for documentation and strip interpretation.

## 2023-01-25 NOTE — ANESTHESIA PREPROCEDURE EVALUATION
Relevant Problems   No relevant active problems       Anesthetic History               Review of Systems / Medical History  Patient summary reviewed, nursing notes reviewed and pertinent labs reviewed    Pulmonary          Smoker (vaped prior to pregnancy)         Neuro/Psych         Psychiatric history     Cardiovascular  Within defined limits                Exercise tolerance: >4 METS     GI/Hepatic/Renal     GERD           Endo/Other  Within defined limits           Other Findings              Physical Exam    Airway  Mallampati: II  TM Distance: 4 - 6 cm  Neck ROM: normal range of motion        Cardiovascular    Rhythm: regular  Rate: normal         Dental  No notable dental hx       Pulmonary  Breath sounds clear to auscultation               Abdominal  GI exam deferred       Other Findings            Anesthetic Plan    ASA: 2  Anesthesia type: epidural      Post-op pain plan if not by surgeon: indwelling epidural catheter      Anesthetic plan and risks discussed with: Patient and Spouse      Epidural risks discussed, including, but not limited to: headache, back pain, infection, seizures, hemodynamic changes, failed epidural.  Pt wishes to proceed.

## 2023-01-25 NOTE — L&D DELIVERY NOTE
Delivery Note    Obstetrician:  Shannon Rodriges CNM    Assistant: none    Pre-Delivery Diagnosis: Term pregnancy, Induced labor, Single fetus, and Pregnancy complicated by: 2 vessel cord    Post-Delivery Diagnosis: Living  infant(s) and Male    Intrapartum Event: none    Procedure: Spontaneous vaginal delivery    Epidural: YES    Monitor:  Fetal Heart Tones - External and Uterine Contractions - External    Indications for instrumental delivery: none    Estimated Blood Loss: 200 mL    Episiotomy: none    Laceration(s):  1st degree    Laceration(s) repair: YES    Presentation: Cephalic    Fetal Description: khan    Fetal Position: Right Occiput Anterior    Birth Weight: 3660 g    Birth Length: 53.3 cm    Apgar - One Minute: 5    Apgar - Five Minutes: 9    Umbilical Cord: 2 vessels present and Cord blood sent to lab for type, Rh, and Ravi' test    Specimens: placenta, delivered, manual removal, sent to pathology           Complications:  Shoulder dystocia           Lab Results   Component Value Date/Time    ABO/Rh(D) A POSITIVE 2023 09:19 AM    Antibody screen NEG 2023 09:19 AM            Attending Attestation: I was present and scrubbed for the entire procedure     Patient feeling urge to push and was found to be 10/100/0. Patient started pushing adequately with coaching. Fetal head crowned and delivered shortly after in OA position which then restituted to NATE, shoulder dystocia was called, Maryjo maneuver and suprapubic pressure applied, and anterior shoulder was delivered, baby was born at 6974 and placed on maternal abdomen for skin to skin. Cord was clamped after 30 seconds and cut by midwife. Infant was moved to warmer for further eval, lu was called. Cord blood was collected for ABO/DNA. Pitocin infusing for active management of third stage. Fentanyl 50 mcg given IV for pain control.  Placenta was manually removed, 2 VC, velamentous cord insertion, cord tore of placenta, placenta delivered in pieces at [address] [facility]. Placenta sent to pathology. Perineum was inspected and first degree perineal laceration was noted, which was repaired with 3-0 vicryl CT1 for hemostasis. Fundus firm at U-2.  mL. Counts correct. Mom and baby stable and bonding.      Signed By:  Gus Lock CNM     January 25, 2023

## 2023-01-25 NOTE — PROGRESS NOTES
Bedside and Verbal shift change report given to Teri Vigil RN (oncoming nurse) by Obdulia Bishop RN (offgoing nurse). Report included the following information SBAR, Kardex, Procedure Summary, Intake/Output, MAR, and Recent Results.

## 2023-01-25 NOTE — PROGRESS NOTES
Post-Partum Day Number 0 Progress Note    Bebe Lawson     Assessment:   Hospital Problems  Date Reviewed: 2023            Codes Class Noted POA    Spontaneous vaginal delivery ICD-10-CM: O80  ICD-9-CM: 071  2023 No        Perineal laceration with delivery, first degree ICD-10-CM: O70.0  ICD-9-CM: 664.01  2023 No        Shoulder dystocia during labor and delivery ICD-10-CM: O66.0  ICD-9-CM: 660.40  2023 No        39 weeks gestation of pregnancy ICD-10-CM: Z3A.39  ICD-9-CM: V22.2  2023 Yes        * (Principal) Single umbilical artery QOK-15-KQ: Q27.0  ICD-9-CM: 747.5  2023 Yes         Doing well, post partum day 0    Plan:  1. Continue routine postpartum and perineal care as well as maternal education. Desires Depo Provera for contraception, but would like more information. Will [place] back as mom tired from just delivering this morning. 2. Desires circumcision for baby. Will do this evening. 3. Anticipate discharge on POD#1    Information for the patient's :  Chivo Rose [[id number]]   Vaginal, Spontaneous  Patient doing well without significant complaint. Voiding without difficulty, normal lochia. Current Facility-Administered Medications   Medication Dose Route Frequency    fentaNYL citrate (PF) 50 mcg/mL injection        fentaNYL- ropivacaine in NS(PF) 2-0.1 mcg/mL-% epidural infusion           Vitals:  Visit Vitals  /69   Pulse 70   Temp 98.8 °F (37.1 °C)   Resp 18   Ht 5' 5\" (1.651 m)   Wt 80.3 kg (177 lb)   LMP 2022   SpO2 98%   Breastfeeding Unknown   BMI 29.45 kg/m²     Temp (24hrs), Av.4 °F (36.9 °C), Min:97.7 °F (36.5 °C), Max:98.8 °F (37.1 °C)        Exam:   Patient without distress. Abdomen soft, fundus firm, nontender                Perineum with normal lochia noted. Lower extremities are negative for swelling, cords or tenderness.     Labs:     Lab Results   Component Value Date/Time    WBC 10.6 01/24/2023 09:19 AM    WBC 8.6 05/26/2022 09:58 AM    WBC 9.1 05/21/2022 02:48 AM    HGB 9.1 (L) 01/24/2023 09:19 AM    HGB 13.1 05/26/2022 09:58 AM    HGB 11.6 (L) 05/21/2022 02:48 AM    HCT 30.0 (L) 01/24/2023 09:19 AM    HCT 39.9 05/26/2022 09:58 AM    HCT 35.2 05/21/2022 02:48 AM    PLATELET 082 97/91/0902 09:19 AM    PLATELET 316 95/49/3168 09:58 AM    PLATELET 219 80/94/1838 02:48 AM       Recent Results (from the past 24 hour(s))   CBC WITH AUTOMATED DIFF    Collection Time: 01/24/23  9:19 AM   Result Value Ref Range    WBC 10.6 4.6 - 13.2 K/uL    RBC 3.90 (L) 4.20 - 5.30 M/uL    HGB 9.1 (L) 12.0 - 16.0 g/dL    HCT 30.0 (L) 35.0 - 45.0 %    MCV 76.9 (L) 78.0 - 100.0 FL    MCH 23.3 (L) 24.0 - 34.0 PG    MCHC 30.3 (L) 31.0 - 37.0 g/dL    RDW 15.6 (H) 11.6 - 14.5 %    PLATELET 304 641 - 081 K/uL    MPV 9.9 9.2 - 11.8 FL    NRBC 0.0 0  WBC    ABSOLUTE NRBC 0.00 0.00 - 0.01 K/uL    NEUTROPHILS 72 40 - 73 %    LYMPHOCYTES 16 (L) 21 - 52 %    MONOCYTES 8 3 - 10 %    EOSINOPHILS 2 0 - 5 %    BASOPHILS 1 0 - 2 %    IMMATURE GRANULOCYTES 1 (H) 0.0 - 0.5 %    ABS. NEUTROPHILS 7.7 1.8 - 8.0 K/UL    ABS. LYMPHOCYTES 1.7 0.9 - 3.6 K/UL    ABS. MONOCYTES 0.8 0.05 - 1.2 K/UL    ABS. EOSINOPHILS 0.2 0.0 - 0.4 K/UL    ABS. BASOPHILS 0.1 0.0 - 0.1 K/UL    ABS. IMM.  GRANS. 0.1 (H) 0.00 - 0.04 K/UL    DF AUTOMATED     TYPE & SCREEN    Collection Time: 01/24/23  9:19 AM   Result Value Ref Range    Crossmatch Expiration 01/27/2023,2359     ABO/Rh(D) A POSITIVE     Antibody screen NEG

## 2023-01-25 NOTE — LACTATION NOTE
This note was copied from a baby's chart. 1643 room full of visitors. Will return. 2050 mom sleeping.

## 2023-01-25 NOTE — PROGRESS NOTES
Problem: Vaginal Delivery: Day of Delivery-Post delivery  Goal: Off Pathway (Use only if patient is Off Pathway)  Outcome: Progressing Towards Goal  Goal: Activity/Safety  Outcome: Progressing Towards Goal  Goal: Consults, if ordered  Outcome: Progressing Towards Goal  Goal: Nutrition/Diet  Outcome: Progressing Towards Goal  Goal: Discharge Planning  Outcome: Progressing Towards Goal  Goal: Medications  Outcome: Progressing Towards Goal  Goal: Treatments/Interventions/Procedures  Outcome: Progressing Towards Goal  Goal: *Vital signs within defined limits  Outcome: Progressing Towards Goal  Goal: *Labs within defined limits  Outcome: Progressing Towards Goal  Goal: *Hemodynamically stable  Outcome: Progressing Towards Goal  Goal: *Optimal pain control at patient's stated goal  Outcome: Progressing Towards Goal  Goal: *Participates in infant care  Outcome: Progressing Towards Goal  Goal: *Demonstrates progressive activity  Outcome: Progressing Towards Goal  Goal: *Tolerating diet  Outcome: Progressing Towards Goal     Problem: Pain  Goal: *Control of Pain  Outcome: Progressing Towards Goal

## 2023-01-25 NOTE — PROGRESS NOTES
2015: Verbal SBAR report received from ALONZO Bermudez RN. Pt sitting up at this time preparing for epidural placement. CRNA at bedside. : Time out  : Epidural started  : Test dose followed by loading dose  : Assisted back to bed after epidural placement. Tolerated procedure well. Vital signs stable. Monitors Adjusted. : SVE 7/90/-1  0000: Pt feeling pressure SVE 9.5/100/0. Pt repositioned to upright position to labor down   0100: Called to patient room as she was unable to tolerate increased constant pressure with uncontrollable urge to push. SVE 10/100/+1. Pt pushing. 0135: Called DEISY No CNM to bedside, pushing with pt. 0200: Pt pushing with \"tug of war\" technique to help with strength of pushes. 0245: Pt attempted pushing with squat bar. 3816: Head delivered, Shoulder dystocia recognized. Maryjo maneuver, and supra pubic pressure applied  0348:  of viable infant male over first degree tear with repair  0407: Manual removal of placenta, 2 vessel velamentous cord was noted. 0430: Lenka care provided, clean pads and ice packs applied. 0600: Pt assisted to bathroom to void. Pt was able to ambulate with assist x1. Steady gait noted. Pt was able to void without difficulties. Lenka care education provided. Clean pads, Ice packs, and tucks pads applied. Pt tolerated well. Ambulated back to bed independently. 3031: Pt transferred to mother baby for routine progression of care. Verbal SBAR report given to Pia Cheng RN. Relinquished care of pt at this time.

## 2023-01-26 VITALS
HEIGHT: 65 IN | HEART RATE: 75 BPM | SYSTOLIC BLOOD PRESSURE: 131 MMHG | DIASTOLIC BLOOD PRESSURE: 77 MMHG | BODY MASS INDEX: 29.49 KG/M2 | OXYGEN SATURATION: 99 % | RESPIRATION RATE: 17 BRPM | WEIGHT: 177 LBS | TEMPERATURE: 98.7 F

## 2023-01-26 LAB
HCT VFR BLD AUTO: 28.5 % (ref 35–45)
HGB BLD-MCNC: 8.7 G/DL (ref 12–16)

## 2023-01-26 PROCEDURE — 74011250637 HC RX REV CODE- 250/637: Performed by: MIDWIFE

## 2023-01-26 PROCEDURE — 85018 HEMOGLOBIN: CPT

## 2023-01-26 PROCEDURE — 74011250637 HC RX REV CODE- 250/637: Performed by: STUDENT IN AN ORGANIZED HEALTH CARE EDUCATION/TRAINING PROGRAM

## 2023-01-26 PROCEDURE — 36415 COLL VENOUS BLD VENIPUNCTURE: CPT

## 2023-01-26 RX ADMIN — FERROUS SULFATE TAB 325 MG (65 MG ELEMENTAL FE) 325 MG: 325 (65 FE) TAB at 09:02

## 2023-01-26 RX ADMIN — IBUPROFEN 800 MG: 400 TABLET, FILM COATED ORAL at 11:03

## 2023-01-26 NOTE — PROGRESS NOTES
Bedside and Verbal shift change report given to Lopez Goodman RN (oncoming nurse) by Michael Agudelo RN (offgoing nurse). Report included the following information SBAR, Kardex, Procedure Summary, Intake/Output, MAR, and Recent Results.

## 2023-01-26 NOTE — ROUTINE PROCESS
Bedside and Verbal shift change report given to Jermaine Goodson RN   (oncoming nurse) by KEENAN Gil RN  (offgoing nurse). Report included the following information SBAR, Kardex, Intake/Output, MAR and Recent Results.

## 2023-01-26 NOTE — DISCHARGE SUMMARY
Obstetrical Discharge Summary     Name: Delphine Barreto MRN: [id number]  SSN: [id number]    YOB: 2000  Age: 25 y.o. Sex: female      Admit Date: 2023    Discharge Date: 2023    Admitting Physician: Candace Luciano MD     Attending Physician:  Betina Beckwith MD     Discharge Diagnoses:   Information for the patient's :  Arline Palencia [[id number]]   Delivery of a 3.66 kg male infant via Vaginal, Spontaneous on 2023 at 3:48 AM  by Lyubov Steward. Apgars were 5  and 9 . Additional Diagnoses:   Problem List as of 2023 Date Reviewed: 2023            Codes Class Noted - Resolved    Spontaneous vaginal delivery ICD-10-CM: O80  ICD-9-CM: 650  2023 - Present        Perineal laceration with delivery, first degree ICD-10-CM: O70.0  ICD-9-CM: 664.01  2023 - Present        Shoulder dystocia during labor and delivery ICD-10-CM: O66.0  ICD-9-CM: 660.40  2023 - Present        39 weeks gestation of pregnancy ICD-10-CM: Z3A.39  ICD-9-CM: V22.2  2023 - Present        * (Principal) Single umbilical artery QRA-91-IN: Q27.0  ICD-9-CM: 747.5  2023 - Present        Normal labor ICD-10-CM: O80, Z37.9  ICD-9-CM: 573  2022 - Present        Ectopic pregnancy without intrauterine pregnancy ICD-10-CM: O00.90  ICD-9-CM: 633.90  2022 - Present        Irregular menses ICD-10-CM: N92.6  ICD-9-CM: 626.4  2020 - Present        RESOLVED: Nexplanon in place ICD-10-CM: Z97.5  ICD-9-CM: V45.52  2020 - 2022    Overview Signed 2020  6:49 PM by PAGE He     Nexplanon- inserted 2020 (outside clinic)              No results found for: RUBELLAEXT, GRBSEXT  Recent Labs     23  0919   HGB 9.1*       Hospital Course: Normal hospital course following the delivery.     Disposition: home    Discharge Condition: Good    Patient Instructions:   Current Discharge Medication List        START taking these medications    Details ibuprofen (MOTRIN) 800 mg tablet Take 1 Tablet by mouth every eight (8) hours as needed for Pain. Qty: 60 Tablet, Refills: 1           CONTINUE these medications which have NOT CHANGED    Details   PNV Comb #2-Iron-Omega 3-FA 04-1-069-200 mg cmpk Take  by mouth. Reference my discharge instructions. Follow-up Appointments   Procedures    FOLLOW UP VISIT Appointment in: 6 Weeks Follow up in 4-6 weeks for your routine postpartum visit. Follow up in 4-6 weeks for your routine postpartum visit.      Standing Status:   Standing     Number of Occurrences:   1     Order Specific Question:   Appointment in     Answer:   6 Weeks        Signed By:  Channing Walter MD     January 25, 2023

## 2023-01-26 NOTE — ROUTINE PROCESS
Bedside and Verbal shift change report given to ALONZO Hall RN  (oncoming nurse) by ANDREIA Jara (offgoing nurse). Report included the following information SBAR, Kardex, Intake/Output, MAR and Recent Results.

## 2023-01-26 NOTE — PROGRESS NOTES
6402: Bedside and verbal shift change report given to ALONZO Hall, RN  by ANDREIA Nesbitt, RN . Assumed care of pt at this time. 1202: D/C teaching complete and copy of D/C teaching instruction given to pt with verbal understanding. Pt given opportunity for questions and denies comments/concerns/questions at this time. Bands verified at this time.  Patient armband removed and shredded

## 2023-01-26 NOTE — DISCHARGE INSTRUCTIONS
Pain medicine:   Ibuprofen/Motrin 800mg every 8 hours alternating with Tylenol 1000mg every 8 hours (Able to take one or the other every 4 hours)    POST DELIVERY DISCHARGE INSTRUCTIONS    Name: Dillon Zeng  YOB: 2000  Primary Diagnosis: Principal Problem:    Single umbilical artery (8/04/1418)    Active Problems:    39 weeks gestation of pregnancy (1/24/2023)      Spontaneous vaginal delivery (1/25/2023)      Perineal laceration with delivery, first degree (1/25/2023)      Shoulder dystocia during labor and delivery (1/25/2023)        General:     Diet/Diet Restrictions:  Eight 8-ounce glasses of fluid daily (water, juices); avoid excessive caffeine intake. Meals/snacks as desired which are high in fiber and carbohydrates and low in fat and cholesterol. Physical Activity / Restrictions / Safety:     Avoid heavy lifting, no more that 8 lbs. For 2-3 weeks; limit use of stairs to 2 times daily for the first week home. No driving for one week. Avoid intercourse 4-6 weeks, no douching or tampon use. Check with obstetrician before starting or resuming an exercise program.         Discharge Instructions/Special Treatment/Home Care Needs:     Continue prenatal vitamins. Continue to use squirt bottle with warm water on your episiotomy after each bathroom use until bleeding stops. If steri-strips applied to your incision, remove in 7-10 days. Call your doctor for the following:     Fever over 101 degrees by mouth. Vaginal bleeding heavier than a normal menstrual period or clot larger than a golf ball. Red streaks or increased swelling of legs, painful red streaks on your breast.  Painful urination, constipation and increased pain or swelling or discharge with your incision. If you feel extremely anxious or overwhelmed. If you have thoughts of harming yourself and/or your baby.     Pain Management:     Pain Management:   Take Acetaminophen (Tylenol) or Ibuprofen (Advil, Motrin), as directed for pain. Use a warm Sitz bath 3 times daily to relieve episiotomy or hemorrhoidal discomfort. Heating pad to  incision as needed. For hemorrhoidal discomfort, use Tucks and Anusol cream as needed and directed. Follow-Up Care: These are general instructions for a healthy lifestyle:    No smoking/ No tobacco products/ Avoid exposure to second hand smoke    Surgeon General's Warning:  Quitting smoking now greatly reduces serious risk to your health. Obesity, smoking, and sedentary lifestyle greatly increases your risk for illness    A healthy diet, regular physical exercise & weight monitoring are important for maintaining a healthy lifestyle    Recognize signs and symptoms of STROKE:    F-face looks uneven    A-arms unable to move or move unevenly    S-speech slurred or non-existent    T-time-call 911 as soon as signs and symptoms begin-DO NOT go       Back to bed or wait to see if you get better-TIME IS BRAIN.

## 2023-01-26 NOTE — PROGRESS NOTES
Problem: Pain  Goal: *Control of Pain  Outcome: Progressing Towards Goal     Problem: Vaginal Delivery: Postpartum Day 1  Goal: Activity/Safety  Outcome: Progressing Towards Goal  Goal: Diagnostic Test/Procedures  Outcome: Progressing Towards Goal  Goal: Nutrition/Diet  Outcome: Progressing Towards Goal  Goal: Discharge Planning  Outcome: Progressing Towards Goal  Goal: Medications  Outcome: Progressing Towards Goal  Goal: Treatments/Interventions/Procedures  Outcome: Progressing Towards Goal  Goal: Psychosocial  Outcome: Progressing Towards Goal  Goal: *Vital signs within defined limits  Outcome: Progressing Towards Goal  Goal: *Labs within defined limits  Outcome: Progressing Towards Goal  Goal: *Hemodynamically stable  Outcome: Progressing Towards Goal  Goal: *Optimal pain control at patient's stated goal  Outcome: Progressing Towards Goal  Goal: *Participates in infant care  Outcome: Progressing Towards Goal  Goal: *Demonstrates progressive activity  Outcome: Progressing Towards Goal

## 2023-01-26 NOTE — PROGRESS NOTES
Problem: Vaginal Delivery: Day of Deliver-Laboring  Goal: Activity/Safety  Outcome: Resolved/Met  Goal: Consults, if ordered  Outcome: Resolved/Met  Goal: Diagnostic Test/Procedures  Outcome: Resolved/Met  Goal: Nutrition/Diet  Outcome: Resolved/Met  Goal: Discharge Planning  Outcome: Resolved/Met  Goal: Medications  Outcome: Resolved/Met  Goal: Respiratory  Outcome: Resolved/Met  Goal: Treatments/Interventions/Procedures  Outcome: Resolved/Met  Goal: *Vital signs within defined limits  Outcome: Resolved/Met  Goal: *Labs within defined limits  Outcome: Resolved/Met  Goal: *Hemodynamically stable  Outcome: Resolved/Met  Goal: *Optimal pain control at patient's stated goal  Outcome: Resolved/Met     Problem: Pain  Goal: *Control of Pain  Outcome: Resolved/Met  Goal: *PALLIATIVE CARE:  Alleviation of Pain  Outcome: Resolved/Met     Problem: Vaginal Delivery: Day of Delivery-Post delivery  Goal: Off Pathway (Use only if patient is Off Pathway)  Outcome: Resolved/Met  Goal: Activity/Safety  Outcome: Resolved/Met  Goal: Consults, if ordered  Outcome: Resolved/Met  Goal: Nutrition/Diet  Outcome: Resolved/Met  Goal: Discharge Planning  Outcome: Resolved/Met  Goal: Medications  Outcome: Resolved/Met  Goal: Treatments/Interventions/Procedures  Outcome: Resolved/Met  Goal: *Vital signs within defined limits  Outcome: Resolved/Met  Goal: *Labs within defined limits  Outcome: Resolved/Met  Goal: *Hemodynamically stable  Outcome: Resolved/Met  Goal: *Optimal pain control at patient's stated goal  Outcome: Resolved/Met  Goal: *Participates in infant care  Outcome: Resolved/Met  Goal: *Demonstrates progressive activity  Outcome: Resolved/Met  Goal: *Tolerating diet  Outcome: Resolved/Met     Problem: Vaginal Delivery: Postpartum Day 1  Goal: Off Pathway (Use only if patient is Off Pathway)  Outcome: Resolved/Met  Goal: Activity/Safety  Outcome: Resolved/Met  Goal: Consults, if ordered  Outcome: Resolved/Met  Goal: Diagnostic Test/Procedures  Outcome: Resolved/Met  Goal: Nutrition/Diet  Outcome: Resolved/Met  Goal: Discharge Planning  Outcome: Resolved/Met  Goal: Medications  Outcome: Resolved/Met  Goal: Treatments/Interventions/Procedures  Outcome: Resolved/Met  Goal: Psychosocial  Outcome: Resolved/Met  Goal: *Vital signs within defined limits  Outcome: Resolved/Met  Goal: *Labs within defined limits  Outcome: Resolved/Met  Goal: *Hemodynamically stable  Outcome: Resolved/Met  Goal: *Optimal pain control at patient's stated goal  Outcome: Resolved/Met  Goal: *Participates in infant care  Outcome: Resolved/Met  Goal: *Demonstrates progressive activity  Outcome: Resolved/Met     Problem: Vaginal Delivery: Postpartum 2  Goal: Off Pathway (Use only if patient is Off Pathway)  Outcome: Resolved/Met  Goal: Activity/Safety  Outcome: Resolved/Met  Goal: Consults, if ordered  Outcome: Resolved/Met  Goal: Nutrition/Diet  Outcome: Resolved/Met  Goal: Discharge Planning  Outcome: Resolved/Met  Goal: Medications  Outcome: Resolved/Met  Goal: Treatments/Interventions/Procedures  Outcome: Resolved/Met  Goal: Psychosocial  Outcome: Resolved/Met     Problem: Vaginal Delivery: Discharge Outcomes  Goal: *Verbalizes name, dosage, time, side effects, and number of days to continue medications  Outcome: Resolved/Met  Goal: *Describes available resources and support systems  Outcome: Resolved/Met  Goal: *No signs and symptoms of infection  Outcome: Resolved/Met  Goal: *Birth certificate information completed  Outcome: Resolved/Met  Goal: *Received and verbalizes understanding of discharge plan and instructions  Outcome: Resolved/Met  Goal: *Vital signs within defined limits  Outcome: Resolved/Met  Goal: *Labs within defined limits  Outcome: Resolved/Met  Goal: *Hemodynamically stable  Outcome: Resolved/Met  Goal: *Optimal pain control at patient's stated goal  Outcome: Resolved/Met  Goal: *Participates in infant care  Outcome: Resolved/Met  Goal: *Demonstrates progressive activity  Outcome: Resolved/Met  Goal: *Appropriate parent-infant bonding  Outcome: Resolved/Met  Goal: *Tolerating diet  Outcome: Resolved/Met

## (undated) DEVICE — TROCAR: Brand: KII® SLEEVE

## (undated) DEVICE — DISPOSABLE SUCTION/IRRIGATOR TUBE SET WITH TIP: Brand: AHTO

## (undated) DEVICE — VISUALIZATION SYSTEM: Brand: CLEARIFY

## (undated) DEVICE — STRIP,CLOSURE,WOUND,MEDI-STRIP,1/2X4: Brand: MEDLINE

## (undated) DEVICE — GYN ONCOLOGY: Brand: MEDLINE INDUSTRIES, INC.

## (undated) DEVICE — SUT VCRL + 0 36IN UR6 VIO --

## (undated) DEVICE — NEEDLE HYPO 21GA L1.5IN GRN POLYPR HUB S STL THN WALL IV

## (undated) DEVICE — DERMABOND SKIN ADH 0.7ML -- DERMABOND ADVANCED 12/BX

## (undated) DEVICE — Device

## (undated) DEVICE — TROCARS: Brand: KII® BALLOON BLUNT TIP SYSTEM

## (undated) DEVICE — TOTAL TRAY, 16FR 10ML SIL FOLEY, URN: Brand: MEDLINE

## (undated) DEVICE — LAPAROSCOPIC TROCAR SLEEVE/SINGLE USE: Brand: KII® OPTICAL ACCESS SYSTEM

## (undated) DEVICE — PAD PT POS 36 IN SURGYPAD DISP

## (undated) DEVICE — SUT MONOCRYL PLUS UD 4-0 --

## (undated) DEVICE — TISSUE RETRIEVAL SYSTEM: Brand: INZII RETRIEVAL SYSTEM

## (undated) DEVICE — GARMENT,MEDLINE,DVT,INT,CALF,MED, GEN2: Brand: MEDLINE

## (undated) DEVICE — GLOVE SURG SZ 65 THK91MIL LTX FREE SYN POLYISOPRENE

## (undated) DEVICE — SOL IRR NACL 0.9% 500ML POUR --